# Patient Record
Sex: MALE | Race: WHITE | HISPANIC OR LATINO | URBAN - METROPOLITAN AREA
[De-identification: names, ages, dates, MRNs, and addresses within clinical notes are randomized per-mention and may not be internally consistent; named-entity substitution may affect disease eponyms.]

---

## 2017-04-26 PROBLEM — Z00.00 ENCOUNTER FOR PREVENTIVE HEALTH EXAMINATION: Status: ACTIVE | Noted: 2017-04-26

## 2017-04-27 ENCOUNTER — OUTPATIENT (OUTPATIENT)
Dept: OUTPATIENT SERVICES | Facility: HOSPITAL | Age: 62
LOS: 1 days | End: 2017-04-27
Payer: COMMERCIAL

## 2017-04-27 ENCOUNTER — APPOINTMENT (OUTPATIENT)
Dept: MRI IMAGING | Facility: CLINIC | Age: 62
End: 2017-04-27

## 2017-04-27 DIAGNOSIS — Z00.8 ENCOUNTER FOR OTHER GENERAL EXAMINATION: ICD-10-CM

## 2017-04-27 PROCEDURE — 73718 MRI LOWER EXTREMITY W/O DYE: CPT

## 2017-11-26 ENCOUNTER — TRANSCRIPTION ENCOUNTER (OUTPATIENT)
Age: 62
End: 2017-11-26

## 2021-07-15 ENCOUNTER — APPOINTMENT (OUTPATIENT)
Dept: DERMATOLOGY | Facility: CLINIC | Age: 66
End: 2021-07-15
Payer: MEDICARE

## 2021-07-15 ENCOUNTER — NON-APPOINTMENT (OUTPATIENT)
Age: 66
End: 2021-07-15

## 2021-07-15 DIAGNOSIS — L82.1 OTHER SEBORRHEIC KERATOSIS: ICD-10-CM

## 2021-07-15 DIAGNOSIS — Z12.83 ENCOUNTER FOR SCREENING FOR MALIGNANT NEOPLASM OF SKIN: ICD-10-CM

## 2021-07-15 DIAGNOSIS — D48.5 NEOPLASM OF UNCERTAIN BEHAVIOR OF SKIN: ICD-10-CM

## 2021-07-15 DIAGNOSIS — D18.01 HEMANGIOMA OF SKIN AND SUBCUTANEOUS TISSUE: ICD-10-CM

## 2021-07-15 PROCEDURE — 11102 TANGNTL BX SKIN SINGLE LES: CPT

## 2021-07-15 PROCEDURE — 99203 OFFICE O/P NEW LOW 30 MIN: CPT | Mod: 25

## 2021-07-23 LAB — CORE LAB BIOPSY: NORMAL

## 2022-06-17 ENCOUNTER — INPATIENT (INPATIENT)
Facility: HOSPITAL | Age: 67
LOS: 6 days | Discharge: HOME | DRG: 896 | End: 2022-06-24
Attending: FAMILY MEDICINE | Admitting: FAMILY MEDICINE
Payer: MEDICARE

## 2022-06-17 VITALS — HEIGHT: 67 IN | WEIGHT: 113.1 LBS

## 2022-06-17 DIAGNOSIS — R29.898 OTHER SYMPTOMS AND SIGNS INVOLVING THE MUSCULOSKELETAL SYSTEM: ICD-10-CM

## 2022-06-17 DIAGNOSIS — Z78.9 OTHER SPECIFIED HEALTH STATUS: Chronic | ICD-10-CM

## 2022-06-17 LAB
ADD ON TEST-SPECIMEN IN LAB: SIGNIFICANT CHANGE UP
ADD ON TEST-SPECIMEN IN LAB: SIGNIFICANT CHANGE UP
ALBUMIN SERPL ELPH-MCNC: 2.7 G/DL — LOW (ref 3.3–5)
ALP SERPL-CCNC: 72 U/L — SIGNIFICANT CHANGE UP (ref 40–120)
ALT FLD-CCNC: 27 U/L — SIGNIFICANT CHANGE UP (ref 12–78)
ANION GAP SERPL CALC-SCNC: 10 MMOL/L — SIGNIFICANT CHANGE UP (ref 5–17)
APPEARANCE UR: CLEAR — SIGNIFICANT CHANGE UP
APTT BLD: 27.7 SEC — SIGNIFICANT CHANGE UP (ref 27.5–35.5)
AST SERPL-CCNC: 31 U/L — SIGNIFICANT CHANGE UP (ref 15–37)
BASOPHILS # BLD AUTO: 0.04 K/UL — SIGNIFICANT CHANGE UP (ref 0–0.2)
BASOPHILS NFR BLD AUTO: 0.5 % — SIGNIFICANT CHANGE UP (ref 0–2)
BILIRUB SERPL-MCNC: 0.7 MG/DL — SIGNIFICANT CHANGE UP (ref 0.2–1.2)
BILIRUB UR-MCNC: NEGATIVE — SIGNIFICANT CHANGE UP
BUN SERPL-MCNC: 8 MG/DL — SIGNIFICANT CHANGE UP (ref 7–23)
CALCIUM SERPL-MCNC: 9 MG/DL — SIGNIFICANT CHANGE UP (ref 8.5–10.1)
CHLORIDE SERPL-SCNC: 98 MMOL/L — SIGNIFICANT CHANGE UP (ref 96–108)
CO2 SERPL-SCNC: 23 MMOL/L — SIGNIFICANT CHANGE UP (ref 22–31)
COLOR SPEC: YELLOW — SIGNIFICANT CHANGE UP
CREAT SERPL-MCNC: 1.12 MG/DL — SIGNIFICANT CHANGE UP (ref 0.5–1.3)
DIFF PNL FLD: ABNORMAL
EGFR: 72 ML/MIN/1.73M2 — SIGNIFICANT CHANGE UP
EOSINOPHIL # BLD AUTO: 0.03 K/UL — SIGNIFICANT CHANGE UP (ref 0–0.5)
EOSINOPHIL NFR BLD AUTO: 0.4 % — SIGNIFICANT CHANGE UP (ref 0–6)
GLUCOSE SERPL-MCNC: 129 MG/DL — HIGH (ref 70–99)
GLUCOSE UR QL: NEGATIVE — SIGNIFICANT CHANGE UP
HCT VFR BLD CALC: 46.8 % — SIGNIFICANT CHANGE UP (ref 39–50)
HGB BLD-MCNC: 15.7 G/DL — SIGNIFICANT CHANGE UP (ref 13–17)
IMM GRANULOCYTES NFR BLD AUTO: 0.6 % — SIGNIFICANT CHANGE UP (ref 0–1.5)
INR BLD: 0.95 RATIO — SIGNIFICANT CHANGE UP (ref 0.88–1.16)
KETONES UR-MCNC: NEGATIVE — SIGNIFICANT CHANGE UP
LACTATE SERPL-SCNC: 1.5 MMOL/L — SIGNIFICANT CHANGE UP (ref 0.7–2)
LEUKOCYTE ESTERASE UR-ACNC: ABNORMAL
LYMPHOCYTES # BLD AUTO: 0.99 K/UL — LOW (ref 1–3.3)
LYMPHOCYTES # BLD AUTO: 11.7 % — LOW (ref 13–44)
MAGNESIUM SERPL-MCNC: 2.4 MG/DL — SIGNIFICANT CHANGE UP (ref 1.6–2.6)
MCHC RBC-ENTMCNC: 33 PG — SIGNIFICANT CHANGE UP (ref 27–34)
MCHC RBC-ENTMCNC: 33.5 GM/DL — SIGNIFICANT CHANGE UP (ref 32–36)
MCV RBC AUTO: 98.3 FL — SIGNIFICANT CHANGE UP (ref 80–100)
MONOCYTES # BLD AUTO: 0.8 K/UL — SIGNIFICANT CHANGE UP (ref 0–0.9)
MONOCYTES NFR BLD AUTO: 9.4 % — SIGNIFICANT CHANGE UP (ref 2–14)
NEUTROPHILS # BLD AUTO: 6.57 K/UL — SIGNIFICANT CHANGE UP (ref 1.8–7.4)
NEUTROPHILS NFR BLD AUTO: 77.4 % — HIGH (ref 43–77)
NITRITE UR-MCNC: NEGATIVE — SIGNIFICANT CHANGE UP
PH UR: 6 — SIGNIFICANT CHANGE UP (ref 5–8)
PHOSPHATE SERPL-MCNC: 2 MG/DL — LOW (ref 2.5–4.5)
PLATELET # BLD AUTO: 157 K/UL — SIGNIFICANT CHANGE UP (ref 150–400)
POTASSIUM SERPL-MCNC: 4 MMOL/L — SIGNIFICANT CHANGE UP (ref 3.5–5.3)
POTASSIUM SERPL-SCNC: 4 MMOL/L — SIGNIFICANT CHANGE UP (ref 3.5–5.3)
PROT SERPL-MCNC: 7.3 GM/DL — SIGNIFICANT CHANGE UP (ref 6–8.3)
PROT UR-MCNC: 100
PROTHROM AB SERPL-ACNC: 11 SEC — SIGNIFICANT CHANGE UP (ref 10.5–13.4)
RAPID RVP RESULT: SIGNIFICANT CHANGE UP
RBC # BLD: 4.76 M/UL — SIGNIFICANT CHANGE UP (ref 4.2–5.8)
RBC # FLD: 14.2 % — SIGNIFICANT CHANGE UP (ref 10.3–14.5)
SARS-COV-2 RNA SPEC QL NAA+PROBE: SIGNIFICANT CHANGE UP
SODIUM SERPL-SCNC: 131 MMOL/L — LOW (ref 135–145)
SP GR SPEC: 1.01 — SIGNIFICANT CHANGE UP (ref 1.01–1.02)
TROPONIN I, HIGH SENSITIVITY RESULT: 9.18 NG/L — SIGNIFICANT CHANGE UP
UROBILINOGEN FLD QL: 1
WBC # BLD: 8.48 K/UL — SIGNIFICANT CHANGE UP (ref 3.8–10.5)
WBC # FLD AUTO: 8.48 K/UL — SIGNIFICANT CHANGE UP (ref 3.8–10.5)

## 2022-06-17 PROCEDURE — 71045 X-RAY EXAM CHEST 1 VIEW: CPT | Mod: 26

## 2022-06-17 PROCEDURE — 83690 ASSAY OF LIPASE: CPT

## 2022-06-17 PROCEDURE — 84100 ASSAY OF PHOSPHORUS: CPT

## 2022-06-17 PROCEDURE — 70553 MRI BRAIN STEM W/O & W/DYE: CPT | Mod: MA

## 2022-06-17 PROCEDURE — 36415 COLL VENOUS BLD VENIPUNCTURE: CPT

## 2022-06-17 PROCEDURE — 90662 IIV NO PRSV INCREASED AG IM: CPT

## 2022-06-17 PROCEDURE — 80048 BASIC METABOLIC PNL TOTAL CA: CPT

## 2022-06-17 PROCEDURE — 72148 MRI LUMBAR SPINE W/O DYE: CPT | Mod: MA

## 2022-06-17 PROCEDURE — 85025 COMPLETE CBC W/AUTO DIFF WBC: CPT

## 2022-06-17 PROCEDURE — 93005 ELECTROCARDIOGRAM TRACING: CPT

## 2022-06-17 PROCEDURE — 85027 COMPLETE CBC AUTOMATED: CPT

## 2022-06-17 PROCEDURE — 71275 CT ANGIOGRAPHY CHEST: CPT

## 2022-06-17 PROCEDURE — 83735 ASSAY OF MAGNESIUM: CPT

## 2022-06-17 PROCEDURE — A9579: CPT

## 2022-06-17 PROCEDURE — 97116 GAIT TRAINING THERAPY: CPT | Mod: GP

## 2022-06-17 PROCEDURE — 87040 BLOOD CULTURE FOR BACTERIA: CPT

## 2022-06-17 PROCEDURE — 80053 COMPREHEN METABOLIC PANEL: CPT

## 2022-06-17 PROCEDURE — 99222 1ST HOSP IP/OBS MODERATE 55: CPT | Mod: GC

## 2022-06-17 PROCEDURE — 82248 BILIRUBIN DIRECT: CPT

## 2022-06-17 PROCEDURE — 71045 X-RAY EXAM CHEST 1 VIEW: CPT

## 2022-06-17 PROCEDURE — 74177 CT ABD & PELVIS W/CONTRAST: CPT

## 2022-06-17 PROCEDURE — 76700 US EXAM ABDOM COMPLETE: CPT

## 2022-06-17 PROCEDURE — 97162 PT EVAL MOD COMPLEX 30 MIN: CPT | Mod: GP

## 2022-06-17 PROCEDURE — 99285 EMERGENCY DEPT VISIT HI MDM: CPT | Mod: FS

## 2022-06-17 PROCEDURE — 93010 ELECTROCARDIOGRAM REPORT: CPT

## 2022-06-17 PROCEDURE — 86803 HEPATITIS C AB TEST: CPT

## 2022-06-17 PROCEDURE — 97530 THERAPEUTIC ACTIVITIES: CPT | Mod: GP

## 2022-06-17 PROCEDURE — 70553 MRI BRAIN STEM W/O & W/DYE: CPT | Mod: 26

## 2022-06-17 PROCEDURE — 70450 CT HEAD/BRAIN W/O DYE: CPT | Mod: 26,MA

## 2022-06-17 PROCEDURE — 72148 MRI LUMBAR SPINE W/O DYE: CPT | Mod: 26

## 2022-06-17 PROCEDURE — 99285 EMERGENCY DEPT VISIT HI MDM: CPT

## 2022-06-17 RX ORDER — ENOXAPARIN SODIUM 100 MG/ML
40 INJECTION SUBCUTANEOUS EVERY 24 HOURS
Refills: 0 | Status: DISCONTINUED | OUTPATIENT
Start: 2022-06-17 | End: 2022-06-24

## 2022-06-17 RX ORDER — PANTOPRAZOLE SODIUM 20 MG/1
40 TABLET, DELAYED RELEASE ORAL
Refills: 0 | Status: DISCONTINUED | OUTPATIENT
Start: 2022-06-17 | End: 2022-06-24

## 2022-06-17 RX ORDER — SODIUM CHLORIDE 9 MG/ML
2000 INJECTION INTRAMUSCULAR; INTRAVENOUS; SUBCUTANEOUS ONCE
Refills: 0 | Status: COMPLETED | OUTPATIENT
Start: 2022-06-17 | End: 2022-06-17

## 2022-06-17 RX ORDER — ACETAMINOPHEN 500 MG
1000 TABLET ORAL ONCE
Refills: 0 | Status: COMPLETED | OUTPATIENT
Start: 2022-06-17 | End: 2022-06-17

## 2022-06-17 RX ORDER — PIPERACILLIN AND TAZOBACTAM 4; .5 G/20ML; G/20ML
3.38 INJECTION, POWDER, LYOPHILIZED, FOR SOLUTION INTRAVENOUS ONCE
Refills: 0 | Status: COMPLETED | OUTPATIENT
Start: 2022-06-17 | End: 2022-06-17

## 2022-06-17 RX ORDER — FOLIC ACID 0.8 MG
1 TABLET ORAL DAILY
Refills: 0 | Status: DISCONTINUED | OUTPATIENT
Start: 2022-06-17 | End: 2022-06-24

## 2022-06-17 RX ORDER — CEFTRIAXONE 500 MG/1
1000 INJECTION, POWDER, FOR SOLUTION INTRAMUSCULAR; INTRAVENOUS ONCE
Refills: 0 | Status: COMPLETED | OUTPATIENT
Start: 2022-06-17 | End: 2022-06-17

## 2022-06-17 RX ORDER — ONDANSETRON 8 MG/1
4 TABLET, FILM COATED ORAL EVERY 4 HOURS
Refills: 0 | Status: DISCONTINUED | OUTPATIENT
Start: 2022-06-17 | End: 2022-06-24

## 2022-06-17 RX ORDER — SODIUM,POTASSIUM PHOSPHATES 278-250MG
1 POWDER IN PACKET (EA) ORAL
Refills: 0 | Status: COMPLETED | OUTPATIENT
Start: 2022-06-17 | End: 2022-06-18

## 2022-06-17 RX ORDER — THIAMINE MONONITRATE (VIT B1) 100 MG
500 TABLET ORAL EVERY 8 HOURS
Refills: 0 | Status: DISCONTINUED | OUTPATIENT
Start: 2022-06-17 | End: 2022-06-20

## 2022-06-17 RX ORDER — OMEGA-3 ACID ETHYL ESTERS 1 G
1 CAPSULE ORAL
Qty: 0 | Refills: 0 | DISCHARGE

## 2022-06-17 RX ORDER — PIPERACILLIN AND TAZOBACTAM 4; .5 G/20ML; G/20ML
3.38 INJECTION, POWDER, LYOPHILIZED, FOR SOLUTION INTRAVENOUS EVERY 8 HOURS
Refills: 0 | Status: DISCONTINUED | OUTPATIENT
Start: 2022-06-17 | End: 2022-06-18

## 2022-06-17 RX ADMIN — Medication 1000 MILLIGRAM(S): at 15:50

## 2022-06-17 RX ADMIN — CEFTRIAXONE 100 MILLIGRAM(S): 500 INJECTION, POWDER, FOR SOLUTION INTRAMUSCULAR; INTRAVENOUS at 15:50

## 2022-06-17 RX ADMIN — SODIUM CHLORIDE 2000 MILLILITER(S): 9 INJECTION INTRAMUSCULAR; INTRAVENOUS; SUBCUTANEOUS at 15:49

## 2022-06-17 NOTE — ED PROVIDER NOTE - OBJECTIVE STATEMENT
65 yo male current day smoker (5 cigarettes daily) and ETOh use (2 shots of vodka daily)with a PMH of GERD (stopped taking his meds) presents with dysuria, frequency, urge incontinence x 1 week. Pt went to see Dr. Das who told him to come to the ER because he noticed that he has been having weakness to his lower extremities (having trouble standing up and walking, with shuffling gait) and also with wife noticed that he is incoherent. +nausea/vomiting.   Denies sob, cp.

## 2022-06-17 NOTE — ED PROVIDER NOTE - PROGRESS NOTE DETAILS
pt seen and examined.  pw weakness and confusion/AMS.  on exam NAD.  no focal deficits on neuro exam.  will admit to medicine for further care and evaluation. MD Marsha

## 2022-06-17 NOTE — H&P ADULT - NSHPREVIEWOFSYSTEMS_GEN_ALL_CORE
REVIEW OF SYSTEMS:  CONSTITUTIONAL: No weakness, fevers or chills  EYES/ENT: No visual changes;  No vertigo or throat pain   NECK: No pain or stiffness  RESPIRATORY: No cough, wheezing, hemoptysis; No shortness of breath  CARDIOVASCULAR: No chest pain or palpitations  GASTROINTESTINAL: No abdominal or epigastric pain. No nausea, vomiting, or hematemesis; No diarrhea or constipation. No melena or hematochezia.  GENITOURINARY: No dysuria, frequency or hematuria  NEUROLOGICAL: No numbness or weakness  SKIN: No itching, rashes REVIEW OF SYSTEMS:  CONSTITUTIONAL: + weakness, No fevers or chills  EYES/ENT: No visual changes;  No vertigo or throat pain   NECK: No pain or stiffness  RESPIRATORY: No cough, wheezing, hemoptysis; No shortness of breath  CARDIOVASCULAR: No chest pain or palpitations  GASTROINTESTINAL: +ve epigastric pain. + nausea, + vomiting. No hematemesis; No diarrhea or constipation. No melena or hematochezia.  GENITOURINARY: No dysuria. + frequency. no hematuria  NEUROLOGICAL: No numbness. Lower extremity weakness  SKIN: No itching, rashes REVIEW OF SYSTEMS:  CONSTITUTIONAL: + weakness, No fevers. +ve chills  EYES/ENT: No visual changes;  No vertigo or throat pain   NECK: No pain or stiffness  RESPIRATORY: No cough, wheezing, hemoptysis; No shortness of breath  CARDIOVASCULAR: No chest pain or palpitations  GASTROINTESTINAL: +ve epigastric pain. + nausea, + vomiting. No hematemesis; No diarrhea or constipation. No melena or hematochezia.  GENITOURINARY: No dysuria. + frequency. no hematuria  NEUROLOGICAL: No numbness. Lower extremity weakness  SKIN: No itching, rashes

## 2022-06-17 NOTE — ED PROVIDER NOTE - CLINICAL SUMMARY MEDICAL DECISION MAKING FREE TEXT BOX
67 yo male presents with urinary complaints, weakness, incoherent. WIll check labs, UA, CT head, cxr, meds for possible UTI. Reeval. -Nicholas Barron PA-C 67 yo male presents with urinary complaints, weakness, incoherent. Will check labs, UA, CT head, cxr, meds for possible UTI. Reeval. -Nicholas Barron PA-C

## 2022-06-17 NOTE — PHARMACOTHERAPY INTERVENTION NOTE - COMMENTS
Medication reconciliation completed.  Reviewed Medication list and confirmed med allergies with patient; confirmed with Dr. Menendez [facility].  pt confirms that he does not take any prescription medication daily.

## 2022-06-17 NOTE — CONSULT NOTE ADULT - SUBJECTIVE AND OBJECTIVE BOX
Patient is a 66y old  Male who presents with a chief complaint of lower extremity weakness, difficulty walking, urinary frequency and memory problems.    HPI: Mr. Valladares is a 66 year old man who reports that for approximately two weeks he has had weakness in his lower extremities.  He describes a loss of mobility and states that his wife reports that he is shuffling when walking.  His wife has also reported problems with memory. He admits to word finding difficulty.  He also reports increased urinary frequency and urgency.    He has recently been cutting back on alcohol intake. He used to have ~ 2 shots every day and is now having 2 shots on some nights.    He had a temperature of 100.4 in the ED.      PAST MEDICAL & SURGICAL HISTORY:  GERD    FAMILY HISTORY:  Heart disease  cancer      Social Hx:  + ETOH and tobacco use    MEDICATIONS  (STANDING):       Allergies    No Known Allergies    Intolerances        ROS: Pertinent positives in HPI, all other ROS were reviewed and are negative.      Vital Signs Last 24 Hrs  T(C): 36.8 (17 Jun 2022 16:45), Max: 38 (17 Jun 2022 14:14)  T(F): 98.3 (17 Jun 2022 16:45), Max: 100.4 (17 Jun 2022 14:14)  HR: 80 (17 Jun 2022 16:45) (80 - 89)  BP: 112/67 (17 Jun 2022 16:45) (111/68 - 129/71)  BP(mean): 88 (17 Jun 2022 14:14) (88 - 88)  RR: 16 (17 Jun 2022 16:45) (15 - 18)  SpO2: 95% (17 Jun 2022 16:45) (95% - 97%)        Constitutional: awake and alert.  HEENT: PERRLA, EOMI,   Neck: Supple.  Respiratory: Breath sounds are clear bilaterally  Cardiovascular: S1 and S2, regular / irregular rhythm  Gastrointestinal: soft, nontender  Extremities:  no edema  Musculoskeletal: no joint swelling/tenderness, no abnormal movements  Skin: No rashes    Neurological exam:  HF: Awake and alert. Oriented to person, place, time.  Appropriately interactive, normal affect. Speech fluent, No Aphasia or paraphasic errors.  CN: CHRIS, EOMI, VFF, facial sensation normal, no NLFD, tongue midline, Palate moves equally, SCM equal bilaterally  Motor: No pronator drift, Strength 5/5 in all 4 ext, normal bulk and tone, no tremor, rigidity or bradykinesia.    Sens: Intact to light touch and joint position sense  Reflexes: Symmetric and normal . BJ 1+, BR 1+, KJ 2+, AJ 1+, downgoing toes b/l  Coord:  No FNFA, dysmetria, HONEY intact   Gait/Balance: steady but short steps            Labs:   06-17    131<L>  |  98  |  8   ----------------------------<  129<H>  4.0   |  23  |  1.12    Ca    9.0      17 Jun 2022 15:07    TPro  7.3  /  Alb  2.7<L>  /  TBili  0.7  /  DBili  x   /  AST  31  /  ALT  27  /  AlkPhos  72  06-17                              15.7   8.48  )-----------( 157      ( 17 Jun 2022 15:07 )             46.8       Radiology:  CT head 6/17/22:  Minimal chronic microvascular changes without evidence ofan   acute transcortical infarction or hemorrhage.

## 2022-06-17 NOTE — H&P ADULT - NSHPSOURCEINFORD_GEN_ALL_CORE
Patient Patient/Spouse/Significant Other Chart(s)/Patient/Spouse/Significant Other/Physician/Provider

## 2022-06-17 NOTE — H&P ADULT - NSHPPHYSICALEXAM_GEN_ALL_CORE
Vital Signs Last 24 Hrs  T(C): 36.8 (17 Jun 2022 16:45), Max: 38 (17 Jun 2022 14:14)  T(F): 98.3 (17 Jun 2022 16:45), Max: 100.4 (17 Jun 2022 14:14)  HR: 80 (17 Jun 2022 19:45) (80 - 89)  BP: 135/71 (17 Jun 2022 19:45) (111/68 - 135/71)  BP(mean): 83 (17 Jun 2022 19:45) (83 - 88)  RR: 18 (17 Jun 2022 19:45) (15 - 18)  SpO2: 98% (17 Jun 2022 19:45) (95% - 98%) Vital Signs Last 24 Hrs  T(C): 36.8 (17 Jun 2022 16:45), Max: 38 (17 Jun 2022 14:14)  T(F): 98.3 (17 Jun 2022 16:45), Max: 100.4 (17 Jun 2022 14:14)  HR: 80 (17 Jun 2022 19:45) (80 - 89)  BP: 135/71 (17 Jun 2022 19:45) (111/68 - 135/71)  BP(mean): 83 (17 Jun 2022 19:45) (83 - 88)  RR: 18 (17 Jun 2022 19:45) (15 - 18)  SpO2: 98% (17 Jun 2022 19:45) (95% - 98%)    PHYSICAL EXAM:  GENERAL: NAD, lying in bed comfortably  HEAD:  Atraumatic, Normocephalic  EYES: EOMI, PERRLA, conjunctiva and sclera clear  ENT: Moist mucous membranes  NECK: Supple, No JVD  Pulm: Clear to auscultation bilaterally; No rales, rhonchi, wheezing, or rubs. Unlabored respirations  Cardio: Regular rate and rhythm; No murmurs, rubs, or gallops  Gastro: Bowel sounds present; Soft, Nontender, Nondistended. No hepatomegally  EXTREMITIES:  2+ Peripheral Pulses, brisk capillary refill. No clubbing, cyanosis, or edema  NERVOUS SYSTEM:  Alert & Oriented speech clear. No deficits   MSK: FROM all 4 extremities,  SKIN: No rashes or lesions Vital Signs Last 24 Hrs  T(C): 36.8 (17 Jun 2022 16:45), Max: 38 (17 Jun 2022 14:14)  T(F): 98.3 (17 Jun 2022 16:45), Max: 100.4 (17 Jun 2022 14:14)  HR: 80 (17 Jun 2022 19:45) (80 - 89)  BP: 135/71 (17 Jun 2022 19:45) (111/68 - 135/71)  BP(mean): 83 (17 Jun 2022 19:45) (83 - 88)  RR: 18 (17 Jun 2022 19:45) (15 - 18)  SpO2: 98% (17 Jun 2022 19:45) (95% - 98%)    PHYSICAL EXAM:  GENERAL: NAD, lying in bed with tremors  HEAD:  Atraumatic, Normocephalic  EYES: EOMI, PERRLA, conjunctiva and sclera clear  ENT: Moist mucous membranes  NECK: Supple, No JVD  Pulm: Clear to auscultation bilaterally; No rales, rhonchi, wheezing, or rubs. Unlabored respirations  Cardio: Regular rate and rhythm; No murmurs, rubs, or gallops  Gastro: Bowel sounds present; Soft, Nontender, Nondistended. No hepatomegally  EXTREMITIES:  2+ Peripheral Pulses, brisk capillary refill. No clubbing, cyanosis, or edema  Genit/uro: MAN Boggy but nonenlarged/nontender prostate  NERVOUS SYSTEM:  Alert & Orientedx3 speech clear. No deficits   MSK: FROM all 4 extremities, 4/5 strength. Unstable on feet  SKIN: No rashes or lesions

## 2022-06-17 NOTE — ED PROVIDER NOTE - ATTENDING APP SHARED VISIT CONTRIBUTION OF CARE
I, Desirae Stovall MD,  performed the initial face to face bedside interview with this patient regarding history of present illness, review of symptoms and relevant past medical, social and family history.  I completed an independent physical examination.  I was the initial provider who evaluated this patient.   I personally saw the patient and performed a substantive portion of the visit including all aspects of the medical decision making.  I have signed out the follow up of any pending tests (i.e. labs, radiological studies) to the [facility].  I have communicated the patient’s plan of care and disposition with the [facility].

## 2022-06-17 NOTE — H&P ADULT - HISTORY OF PRESENT ILLNESS
Pt is a 65 yo male current day smoker (5 cigarettes daily) and ETOh use (2 shots of vodka daily) with a PMH of GERD who presents with dysuria, frequency, urge incontinence x 1 week. Pt went to see Dr. Das who told him to come to the ER because he noticed that he has been having weakness to his lower extremities (having trouble standing up and walking, with shuffling gait) and also with wife noticed that he is incoherent. +nausea/vomiting.       Patient is a 65 yo male current day smoker (5 cigarettes daily) and Chronic ETOH missuse (10+ years) with a PMH of GERD who presents with weakness, frequency, urge incontinence x 1 week. Patient reports he went to see Dr. Das(PCP) today for this problems and was told to come to the ER. Pt reports he noticed associated weakness to his lower extremities (having trouble standing up and walking, with shuffling gait) and also with wife noticed that he is incoherent. +nausea/vomiting.        Patient is a 65 yo male current day smoker (5 cigarettes daily) and Chronic ETOH missuse (10+ years) with a PMH of GERD who presents with weakness, frequency, urge incontinence x 1 week. Patient reports he went to see Dr. Das(PCP) today for this problems and was told to come to the ER. Pt reports he noticed increased weakness to his lower extremities (having trouble standing up and walking, with shuffling gait) for the past 2 days. Patient has a chronic hx of nocturia (Voids 3-4x) a night for past 5 years and has not been evaluated for this medical issue. Spouse notes patient with increased confusion and short term memory loss in the past week. Denies hx of UTIs or ETOH withdrawals. Denies fevers, chills, HA, recent falls. Last drink was yesterday night.     In the ED patient VS were temp 100.4, /71, RR 18, Pulse ox 97. CT head was unremarkable for intracranial bleeding, POCT UA was positive for blood and bacteria. Pt was given 2L of fluids and ceftriaxone.                 Patient is a 65 yo male current day smoker (5 cigarettes daily) and Chronic ETOH missuse (10+ years) with a PMH of GERD who presents with weakness, frequency, urge incontinence x 2 weeks. Patient reports he went to see Dr. Das(PCP) today for this problems and was told to come to the ER. Pt reports he noticed increased weakness to his lower extremities (having trouble standing up and walking, with shuffling gait) for the past 2 days. Patient has a chronic hx of nocturia (Voids 3-4x) a night for past 5 years and has not been evaluated for this medical issue. Spouse notes patient with increased confusion and short term memory loss in the past week. Denies hx of UTIs or ETOH withdrawals. Denies fevers, chills, HA, recent falls, urinary hesitancy. Last etoh drink was yesterday night.     In the ED patient VS were temp 100.4, /71, RR 18, Pulse ox 97. CT head was unremarkable for intracranial bleeding, POCT UA was positive for blood and bacteria. Pt was given 2L of fluids and ceftriaxone.

## 2022-06-17 NOTE — ED ADULT NURSE REASSESSMENT NOTE - NS ED NURSE REASSESS COMMENT FT1
Assumed care of Pt from DERRICK Presley. Pt received resting comfortably in stretcher. Pt's wife at bedside. Pt's fluids running through IV in left AC. IV is positional. Vital signs stable. No additional requests or complaints. Patient safety maintained. Call bell within reach. Will continue to monitor.

## 2022-06-17 NOTE — ED STATDOCS - PROGRESS NOTE DETAILS
Jimi Emerson for attending Dr. Hull   67 yo male w/PMHx of GERD presents to the ED c/o SOB, frequent urination, fever. Pt denies CP. +dysuria. Pt has nausea and vomiting. No blood in emesis. NKDA. Denies sick contacts. Pt went to PMD today and was told to come to the ED for evaluation. According to the wife, pt has cognitive impairment, not walking well, trouble remembering, shuffling his feet. +diaphoretic today. No other complaints at this time. Did not take any medication today. Will send pt to main ED for further evaluation.

## 2022-06-17 NOTE — H&P ADULT - ASSESSMENT
#Generalized weakness in the setting of Hyponatremia  -R/O infectious (UTI vs Prostatis) vs metabolic vs neurological etiology vs ETOH withdrawal  -Admit to 2S  -HD Stable  -CT Head positive for microvascular changes  -POCT UA- +ve for Protein, +ve bacteria, +ve blood  - on addmission- Repeat   -Neurology on Board- Less likely NPH. F/U MRI   -Ordered ID consult    #Hx of ETOH missuse  -ETOH level less than 10  -Symptom triggered CIWA protocol      #Code status      #DVT proph                 #Metabolic encephalopathy in the setting of Hyponatremia  -R/O infectious (UTI vs Prostatis) vs metabolic etiology vs neurological etiology vs ETOH withdrawal  -Admit to 2S  -HD Stable  -CT Head positive for microvascular changes  -SIRS 1/4 temp 100.4 F Mod QSofa 1/3 AMS  -POCT UA- +ve for Protein, +ve bacteria, +ve blood  -S/P Ceftriaxone and 2L bolus in Ed  -FU urine and blood cultures  - on admission Repeat   -Neurology on Board- Less likely NPH. F/U MRI   -Ordered ID consult  -Ordered PT Consult     #Hx of ETOH Abuse  -2L of Scotch every 3 days.  Last drink yesterday evening   -ETOH level less than 10 on admission  -Standing Ativan taper CIWA protocol      #Hyponatremia  -F/U BMP  -s/p 2L NS in Ed    #LUTS in setting of BPH   -R/O prostatitis vs UTI  -Ordered routine bladder scans      #Code status  -Full Code    #DVT proph  -Lovenox 40U Daily                #Metabolic encephalopathy in the setting of Hyponatremia  -R/O infectious (UTI vs Prostatitis vs metabolic etiology vs neurological etiology vs ETOH withdrawal  -Admit to 2S  -HD Stable  -CT Head positive for microvascular changes  -SIRS 1/4 temp 100.4 F Mod QSofa 1/3 AMS  -POCT UA- +ve for Protein, +ve bacteria, +ve blood  -S/P Ceftriaxone and 2L bolus in Ed  -FU urine and blood cultures  - on admission Repeat   -Neurology on Board- Less likely NPH. F/U MRI   -Ordered ID consult  -Ordered PT Consult     #Hx of ETOH Abuse  -2L of Scotch every 3 days.  Last drink yesterday evening   -ETOH level less than 10 on admission  -Standing Ativan taper CIWA protocol      #Hyponatremia  -F/U BMP  -s/p 2L NS in Ed    #LUTS in setting of BPH   -R/O prostatitis vs UTI  -Ordered routine bladder scans    #GERD  -Ordered Omeprazole PO daily     #Code status  -Full Code    #DVT proph  -Lovenox 40U Daily                #Generalized weakness in the setting of multifactorial causes(Chronic ETOH ABuse vs UTI)  -R/O infectious (UTI vs Prostatitis) vs etoh abuse vs metabolic etiology vs neurological etiology   -Admit to 2S  -HD Stable  -CT Head positive for microvascular changes  -SIRS 1/4 temp 100.4 F Mod QSofa 1/3 AMS  -POCT UA- +ve for Protein, +ve bacteria, +ve blood  -S/P Ceftriaxone and 2L bolus in Ed  -Start Zosyn IV   -FU urine and blood cultures  - on admission Repeat 134  -Neurology on Board- Less likely NPH. F/U MRI head  -Ordered ID consult  -Ordered PT Consult   -Low threshold to order CT abdomen/pelvis if spikes another temp     #ETOH Withdrawal  #Hx of ETOH Abuse  -2L of Scotch every 3 days.  Last drink yesterday evening   -ETOH level less than 10 on admission  -Standing librium/Ativan taper CIWA protocol    #Urge incontinence  -R/O prostatitis vs UTI  -Ordered routine bladder scans  -Ordered Urology referral   -Glucose 130 A1c Ordered     #Hyponatremia  #Hypophosphatemia  -F/U BMP  -s/p 2L NS in Ed  -Replete lytes    #GERD  -Ordered Omeprazole PO daily     #Code status  -Full Code    #DVT proph  -Lovenox 40U Daily                #Generalized weakness in the setting of multifactorial causes(Chronic ETOH ABuse vs UTI)  -R/O infectious (UTI vs Prostatitis) vs etoh abuse vs metabolic etiology vs neurological etiology   -Admit to 2S  -HD Stable  -CT Head positive for microvascular changes  -SIRS 1/4 temp 100.4 F Mod QSofa 1/3 AMS  -POCT UA- +ve for Protein, +ve bacteria, +ve blood  -S/P Ceftriaxone and 2L bolus in Ed  -Start Zosyn IV   -FU urine and blood cultures  - on admission Repeat 134  -Neurology on Board- Less likely NPH. F/U MRI head  -Ordered ID consult  -Ordered PT Consult   -Low threshold to order CT abdomen/pelvis if spikes another temp     #Mild ETOH Withdrawal  #Hx of ETOH Abuse  DDx- Wernieke Encephalopathy   -2L of Scotch every 3 days for past 10 years. Last drink yesterday evening.   -ETOH level less than 10 on admission  -CIWA Score 2 on 2S  -Standing librium with Ativan symptom triggered CIWA protocol. High risk for ETOH Withdrawal. Switch to standing ativan if symptoms worsen in next 24-48 hours.  -Start thiamine 500mg IV TID and folate    #Urge incontinence  -R/O prostatitis vs UTI  -Ordered routine bladder scans  -Ordered Urology referral   -Glucose 130 A1c Ordered     #Hyponatremia  #Hypophosphatemia  -F/U BMP  -s/p 2L NS in Ed  -Replete lytes    #GERD  -Ordered Omeprazole PO daily     #Code status  -Full Code    #DVT proph  -Lovenox 40U Daily

## 2022-06-17 NOTE — H&P ADULT - NSHPSOCIALHISTORY_GEN_ALL_CORE
Chronic Smoker 5 ciggarettes daily for 20+ years. Chronic ETOH Use 10 + years drinks Scotch 2 L every 3 days. Lives at home with his wife. Independent of ADLs and IADLS.

## 2022-06-17 NOTE — ED ADULT NURSE REASSESSMENT NOTE - NS ED NURSE REASSESS COMMENT FT1
Report given to Tiana on [address]. Pt to be transported to unit from [facility].

## 2022-06-17 NOTE — PATIENT PROFILE ADULT - FALL HARM RISK - HARM RISK INTERVENTIONS

## 2022-06-17 NOTE — ED ADULT NURSE NOTE - OBJECTIVE STATEMENT
pt presents to the ED c/o dysuria, frequency, urge incontinence x 1 week. pt states his urine is "foul smelling." as per pt wife also noticed patient with difficulty ambulating with lower extremity weakness and "shuffling gait" and unsteady gait x 1 week. pt is awake and following commands appropriately upon assessment. A+Ox4, speech is clear, able to move all extremities. Denies headache, dizziness, visual changes. Denies cp. Safety is maintained with call bell within reach

## 2022-06-17 NOTE — ED ADULT TRIAGE NOTE - CHIEF COMPLAINT QUOTE
pt c/o SOB at rest and urinary incontinence with foul smelling urine x 1 week. denies fevers or dysuria.

## 2022-06-17 NOTE — ED ADULT NURSE NOTE - NS ED NURSE LEVEL OF CONSCIOUSNESS AFFECT
Left message on patient's voicemail to return call to clinic regarding scheduling Diabetes management appointment with Brittni Katz NP for elevated A1c. Waiting to hear back.  
Calm

## 2022-06-17 NOTE — ED PROVIDER NOTE - CARE PLAN
1 Principal Discharge DX:	Lower extremity weakness  Secondary Diagnosis:	Incontinence  Secondary Diagnosis:	Confusion

## 2022-06-18 LAB
ALBUMIN SERPL ELPH-MCNC: 2.1 G/DL — LOW (ref 3.3–5)
ALP SERPL-CCNC: 68 U/L — SIGNIFICANT CHANGE UP (ref 40–120)
ALT FLD-CCNC: 26 U/L — SIGNIFICANT CHANGE UP (ref 12–78)
ANION GAP SERPL CALC-SCNC: 8 MMOL/L — SIGNIFICANT CHANGE UP (ref 5–17)
AST SERPL-CCNC: 26 U/L — SIGNIFICANT CHANGE UP (ref 15–37)
BILIRUB SERPL-MCNC: 0.5 MG/DL — SIGNIFICANT CHANGE UP (ref 0.2–1.2)
BUN SERPL-MCNC: 7 MG/DL — SIGNIFICANT CHANGE UP (ref 7–23)
CALCIUM SERPL-MCNC: 8.7 MG/DL — SIGNIFICANT CHANGE UP (ref 8.5–10.1)
CHLORIDE SERPL-SCNC: 104 MMOL/L — SIGNIFICANT CHANGE UP (ref 96–108)
CO2 SERPL-SCNC: 22 MMOL/L — SIGNIFICANT CHANGE UP (ref 22–31)
CREAT SERPL-MCNC: 0.92 MG/DL — SIGNIFICANT CHANGE UP (ref 0.5–1.3)
CULTURE RESULTS: SIGNIFICANT CHANGE UP
EGFR: 92 ML/MIN/1.73M2 — SIGNIFICANT CHANGE UP
GLUCOSE SERPL-MCNC: 113 MG/DL — HIGH (ref 70–99)
HCV AB S/CO SERPL IA: 0.07 S/CO — SIGNIFICANT CHANGE UP (ref 0–0.99)
HCV AB SERPL-IMP: SIGNIFICANT CHANGE UP
MAGNESIUM SERPL-MCNC: 2.4 MG/DL — SIGNIFICANT CHANGE UP (ref 1.6–2.6)
PHOSPHATE SERPL-MCNC: 2.8 MG/DL — SIGNIFICANT CHANGE UP (ref 2.5–4.5)
POTASSIUM SERPL-MCNC: 3.9 MMOL/L — SIGNIFICANT CHANGE UP (ref 3.5–5.3)
POTASSIUM SERPL-SCNC: 3.9 MMOL/L — SIGNIFICANT CHANGE UP (ref 3.5–5.3)
PROT SERPL-MCNC: 6.3 GM/DL — SIGNIFICANT CHANGE UP (ref 6–8.3)
SODIUM SERPL-SCNC: 134 MMOL/L — LOW (ref 135–145)
SPECIMEN SOURCE: SIGNIFICANT CHANGE UP
VIT B12 SERPL-MCNC: 531 PG/ML — SIGNIFICANT CHANGE UP (ref 232–1245)

## 2022-06-18 PROCEDURE — 99233 SBSQ HOSP IP/OBS HIGH 50: CPT | Mod: GC

## 2022-06-18 RX ORDER — CEFTRIAXONE 500 MG/1
1000 INJECTION, POWDER, FOR SOLUTION INTRAMUSCULAR; INTRAVENOUS EVERY 24 HOURS
Refills: 0 | Status: DISCONTINUED | OUTPATIENT
Start: 2022-06-18 | End: 2022-06-23

## 2022-06-18 RX ORDER — AZITHROMYCIN 500 MG/1
500 TABLET, FILM COATED ORAL DAILY
Refills: 0 | Status: DISCONTINUED | OUTPATIENT
Start: 2022-06-18 | End: 2022-06-22

## 2022-06-18 RX ORDER — INFLUENZA VIRUS VACCINE 15; 15; 15; 15 UG/.5ML; UG/.5ML; UG/.5ML; UG/.5ML
0.7 SUSPENSION INTRAMUSCULAR ONCE
Refills: 0 | Status: COMPLETED | OUTPATIENT
Start: 2022-06-18 | End: 2022-06-24

## 2022-06-18 RX ADMIN — PANTOPRAZOLE SODIUM 40 MILLIGRAM(S): 20 TABLET, DELAYED RELEASE ORAL at 06:14

## 2022-06-18 RX ADMIN — Medication 255 MILLIGRAM(S): at 16:14

## 2022-06-18 RX ADMIN — AZITHROMYCIN 500 MILLIGRAM(S): 500 TABLET, FILM COATED ORAL at 09:54

## 2022-06-18 RX ADMIN — Medication 1 PACKET(S): at 06:20

## 2022-06-18 RX ADMIN — Medication 1 PACKET(S): at 00:30

## 2022-06-18 RX ADMIN — Medication 255 MILLIGRAM(S): at 23:44

## 2022-06-18 RX ADMIN — Medication 255 MILLIGRAM(S): at 01:12

## 2022-06-18 RX ADMIN — CEFTRIAXONE 100 MILLIGRAM(S): 500 INJECTION, POWDER, FOR SOLUTION INTRAMUSCULAR; INTRAVENOUS at 09:54

## 2022-06-18 RX ADMIN — Medication 1 MILLIGRAM(S): at 09:53

## 2022-06-18 RX ADMIN — Medication 2 MILLIGRAM(S): at 22:22

## 2022-06-18 RX ADMIN — Medication 10 MILLIGRAM(S): at 00:29

## 2022-06-18 RX ADMIN — ENOXAPARIN SODIUM 40 MILLIGRAM(S): 100 INJECTION SUBCUTANEOUS at 00:30

## 2022-06-18 RX ADMIN — ENOXAPARIN SODIUM 40 MILLIGRAM(S): 100 INJECTION SUBCUTANEOUS at 23:43

## 2022-06-18 RX ADMIN — Medication 255 MILLIGRAM(S): at 10:43

## 2022-06-18 RX ADMIN — PIPERACILLIN AND TAZOBACTAM 200 GRAM(S): 4; .5 INJECTION, POWDER, LYOPHILIZED, FOR SOLUTION INTRAVENOUS at 00:30

## 2022-06-18 NOTE — PROGRESS NOTE ADULT - ASSESSMENT
#Generalized weakness in the setting of multifactorial causes(Chronic ETOH ABuse vs UTI)  -R/O infectious (UTI vs Prostatitis) vs etoh abuse vs metabolic etiology vs neurological etiology     Likely Pneumonia, UTI  -Admit to 2S  -HD Stable  -CT Head positive for microvascular changes  -SIRS 1/4 temp 100.4 F Mod QSofa 1/3 AMS  -POCT UA- +ve for Protein, +ve bacteria, +ve blood  -S/P Ceftriaxone and 2L bolus in Ed  -S/p Zosyn IV; ID: ceftriaxone + azithromycin  -FU urine and blood cultures  - on admission Repeat 134  -Neurology on Board- Less likely NPH. F/U MRI head  -Ordered ID consult  -Ordered PT Consult   -Low threshold to order CT abdomen/pelvis if spikes another temp     #Mild ETOH Withdrawal  #Hx of ETOH Abuse  DDx- Wernieke Encephalopathy   -2L of Scotch every 3 days for past 10 years. Last drink yesterday evening.   -ETOH level less than 10 on admission  -CIWA Score 2 on 2S  -Standing librium with Ativan symptom triggered CIWA protocol. High risk for ETOH Withdrawal. Switch to standing ativan if symptoms worsen in next 24-48 hours.  -Start thiamine 500mg IV TID and folate    #Urge incontinence  -R/O prostatitis vs UTI  -Ordered routine bladder scans  -Ordered Urology referral   -Glucose 130 A1c Ordered     #Hyponatremia  #Hypophosphatemia  -F/U BMP  -s/p 2L NS in Ed  -Replete lytes    #GERD  -Ordered Omeprazole PO daily     #Code status  -Full Code    #DVT proph  -Lovenox 40U Daily      Patient is a 67 yo male current day smoker (5 cigarettes daily) and Chronic ETOH missuse (10+ years) with a PMH of GERD who presents with weakness, frequency, urge incontinence x 2 weeks.     #Weakness 2/2 Likely Acute UTI, possible Subacute Pneumonia  #Urge incontinence  -Admit to 2S  -HD Stable  -CT Head positive for microvascular changes  -SIRS 1/4 temp 100.4 F Mod QSofa 1/3 AMS  -POCT UA- +ve for Protein, +ve bacteria, +ve blood  -S/P Ceftriaxone and 2L bolus in Ed  -S/p Zosyn IV; ID recs: ceftriaxone + azithromycin (day 1)  -FU urine and blood cultures  - on admission Repeat 134  -Neurology on Board- Less likely NPH. F/U MRI head  -Ordered ID consult  -Ordered PT Consult   -Ordered routine bladder scans  -Ordered Urology referral   -Low threshold to order CT abdomen/pelvis if spikes another temp     #Confusion 2/2 Wernicke's  #Hx of ETOH Abuse  - Pt w/long hx of ETOH abuse, now confused,  ETOH level less than 10 on admission, CIWAs 0-2  - S/p librium dose, C/w Ativan symptom triggered CIWA protocol for now d/t high risk for ETOH Withdrawal.   - C/w thiamine 500mg IV TID and folate  - Neurology consult     #GERD  -C/w Omeprazole PO daily     #Code status  -Full Code    #DVT pppx  -Lovenox 40U Daily     Case discussed with Dr. Cui

## 2022-06-18 NOTE — DIETITIAN INITIAL EVALUATION ADULT - PERTINENT LABORATORY DATA
06-18    134<L>  |  104  |  7   ----------------------------<  113<H>  3.9   |  22  |  0.92    Ca    8.7      18 Jun 2022 07:48  Phos  2.8     06-18  Mg     2.4     06-18    TPro  6.3  /  Alb  2.1<L>  /  TBili  0.5  /  DBili  x   /  AST  26  /  ALT  26  /  AlkPhos  68  06-18

## 2022-06-18 NOTE — DIETITIAN INITIAL EVALUATION ADULT - ADD RECOMMEND
1.Continue DASH diet.  2. MVI w/ minerals daily to ensure 100% RDA met   3. Monitor bowel movements, if no BM for >3 days, consider implementing bowel regimen.   4. RDN will continue to monitor PO intake, labs, hydration, and wt prn.

## 2022-06-18 NOTE — CONSULT NOTE ADULT - SUBJECTIVE AND OBJECTIVE BOX
Patient is a 66y old  Male who presents with a chief complaint of Weakness and LUTS    HPI:  67 y/o male with h/o GERD was admitted on  for weakness, urinary frequency, urge incontinence x 2 weeks. Patient reports he went to see Dr. Ferguson (PCP) on the day of admission for this problems and was told to come to the ER. Pt reports he noticed increased weakness to his lower extremities (having trouble standing up and walking, with shuffling gait) for the past 2 days. Patient has nocturia (Voids 3-4x) a night for past 5 years. Spouse notes patient with increased confusion and short term memory loss in the past week. Denies fever, chills, HA, recent falls. In the ED patient VS were temp 100.4F and received ceftriaxone.     PMH: as above  PSH: as above  Meds: per reconciliation sheet, noted below  MEDICATIONS  (STANDING):  enoxaparin Injectable 40 milliGRAM(s) SubCutaneous every 24 hours  folic acid 1 milliGRAM(s) Oral daily  influenza  Vaccine (HIGH DOSE) 0.7 milliLiter(s) IntraMuscular once  pantoprazole    Tablet 40 milliGRAM(s) Oral before breakfast  piperacillin/tazobactam IVPB.. 3.375 Gram(s) IV Intermittent every 8 hours  thiamine IVPB 500 milliGRAM(s) IV Intermittent every 8 hours    MEDICATIONS  (PRN):  LORazepam   Injectable 2 milliGRAM(s) IV Push every 1 hour PRN Symptom-triggered: each CIWA -Ar score 8 or GREATER  ondansetron Injectable 4 milliGRAM(s) IV Push every 4 hours PRN Nausea and/or Vomiting    Allergies    No Known Allergies  Intolerances    Social: current day smoker (5 cigarettes daily), Chronic ETOH missuse (10+ years), no illegal drugs; no recent travel, no exposure to TB  FAMILY HISTORY:  FH: HTN (hypertension) (Father)  no history of premature cardiovascular disease in first degree relatives    ROS: the patient denies fever, no chills, no HA, no seizures, no dizziness, no sore throat, no nasal congestion, no blurry vision, no CP, no palpitations, no SOB, no cough, no abdominal pain, no diarrhea, no N/V, has urinary frequency, no leg pain, no claudication, no rash, no joint aches, no rectal pain or bleeding, no night sweats  All other systems reviewed and are negative    Vital Signs Last 24 Hrs  T(C): 37.7 (2022 07:25), Max: 38 (2022 14:14)  T(F): 99.8 (2022 07:25), Max: 100.4 (2022 14:14)  HR: 90 (2022 07:25) (80 - 93)  BP: 133/75 (2022 07:25) (111/68 - 152/67)  BP(mean): 83 (2022 19:45) (83 - 88)  RR: 19 (2022 07:25) (15 - 19)  SpO2: 93% (2022 07:25) (93% - 98%)  Daily Height in cm: 170.18 (2022 13:42)    Daily     PE:    Constitutional:  No acute distress  HEENT: NC/AT, EOMI, PERRLA, conjunctivae clear; ears and nose atraumatic; pharynx benign  Neck: supple; thyroid not palpable  Back: no tenderness  Respiratory: respiratory effort normal; crackles at left base  Cardiovascular: S1S2 regular, no murmurs  Abdomen: soft, not tender, not distended, positive BS; no liver or spleen organomegaly  Genitourinary: no suprapubic tenderness  Lymphatic: no LN palpable  Musculoskeletal: no muscle tenderness, no joint swelling or tenderness  Extremities: no pedal edema  Neurological/ Psychiatric: AxOx3, judgement and insight impaired; moving all extremities  Skin: no rashes; no palpable lesions    Labs: all available labs reviewed                        15.7   8.48  )-----------( 157      ( 2022 15:07 )             46.8     06-18    134<L>  |  104  |  7   ----------------------------<  113<H>  3.9   |  22  |  0.92    Ca    8.7      2022 07:48  Phos  2.8     06-18  Mg     2.4     06-18    TPro  6.3  /  Alb  2.1<L>  /  TBili  0.5  /  DBili  x   /  AST  26  /  ALT  26  /  AlkPhos  68  06-18     LIVER FUNCTIONS - ( 2022 07:48 )  Alb: 2.1 g/dL / Pro: 6.3 gm/dL / ALK PHOS: 68 U/L / ALT: 26 U/L / AST: 26 U/L / GGT: x           Urinalysis Basic - ( 2022 15:07 )    Color: Yellow / Appearance: Clear / S.015 / pH: x  Gluc: x / Ketone: Negative  / Bili: Negative / Urobili: 1   Blood: x / Protein: 100 / Nitrite: Negative   Leuk Esterase: Trace / RBC: 6-10 /HPF / WBC 0-2   Sq Epi: x / Non Sq Epi: Occasional / Bacteria: Moderate    ( @ 15:07)  NotDetec    Radiology: all available radiological tests reviewed    < from: Xray Chest 1 View- PORTABLE-Urgent (22 @ 15:43) >  IMPRESSION:   LEFT mid zone peripheral focal airspace disease.  < end of copied text >      Advanced directives addressed: full resuscitation Patient is a 66y old  Male who presents with a chief complaint of Weakness and LUTS    HPI:  67 y/o male with h/o GERD was admitted on  for weakness, urinary frequency, urge incontinence x 2 weeks. Patient reports he went to see Dr. Ferguson (PCP) on the day of admission for this problems and was told to come to the ER. Pt reports he noticed increased weakness to his lower extremities (having trouble standing up and walking, with shuffling gait) for the past 2 days. Patient has nocturia (Voids 3-4x) a night for past 5 years. Spouse notes patient with increased confusion and short term memory loss in the past week. Denies fever, chills, HA, recent falls. In the ED patient VS were temp 100.4F and received ceftriaxone.     PMH: as above  PSH: as above  Meds: per reconciliation sheet, noted below  MEDICATIONS  (STANDING):  enoxaparin Injectable 40 milliGRAM(s) SubCutaneous every 24 hours  folic acid 1 milliGRAM(s) Oral daily  influenza  Vaccine (HIGH DOSE) 0.7 milliLiter(s) IntraMuscular once  pantoprazole    Tablet 40 milliGRAM(s) Oral before breakfast  piperacillin/tazobactam IVPB.. 3.375 Gram(s) IV Intermittent every 8 hours  thiamine IVPB 500 milliGRAM(s) IV Intermittent every 8 hours    MEDICATIONS  (PRN):  LORazepam   Injectable 2 milliGRAM(s) IV Push every 1 hour PRN Symptom-triggered: each CIWA -Ar score 8 or GREATER  ondansetron Injectable 4 milliGRAM(s) IV Push every 4 hours PRN Nausea and/or Vomiting    Allergies    No Known Allergies  Intolerances    Social: current day smoker (5 cigarettes daily), Chronic ETOH missuse (10+ years), no illegal drugs; no recent travel, no exposure to TB  FAMILY HISTORY:  FH: HTN (hypertension) (Father)  no history of premature cardiovascular disease in first degree relatives    ROS: the patient is confused; limited; poorly verbal  All other systems reviewed and are negative    Vital Signs Last 24 Hrs  T(C): 37.7 (2022 07:25), Max: 38 (2022 14:14)  T(F): 99.8 (2022 07:25), Max: 100.4 (2022 14:14)  HR: 90 (2022 07:25) (80 - 93)  BP: 133/75 (2022 07:25) (111/68 - 152/67)  BP(mean): 83 (2022 19:45) (83 - 88)  RR: 19 (2022 07:25) (15 - 19)  SpO2: 93% (2022 07:25) (93% - 98%)  Daily Height in cm: 170.18 (2022 13:42)    Daily     PE:    Constitutional:  No acute distress  HEENT: NC/AT, EOMI, PERRLA, conjunctivae clear; ears and nose atraumatic; pharynx benign  Neck: supple; thyroid not palpable  Back: no tenderness  Respiratory: respiratory effort normal; crackles at left base  Cardiovascular: S1S2 regular, no murmurs  Abdomen: soft, not tender, not distended, positive BS; no liver or spleen organomegaly  Genitourinary: no suprapubic tenderness  Lymphatic: no LN palpable  Musculoskeletal: no muscle tenderness, no joint swelling or tenderness  Extremities: no pedal edema  Neurological/ Psychiatric: Alert, judgement and insight impaired; moving all extremities  Skin: no rashes; no palpable lesions    Labs: all available labs reviewed                        15.7   8.48  )-----------( 157      ( 2022 15:07 )             46.8     06-18    134<L>  |  104  |  7   ----------------------------<  113<H>  3.9   |  22  |  0.92    Ca    8.7      2022 07:48  Phos  2.8     06-18  Mg     2.4     06-18    TPro  6.3  /  Alb  2.1<L>  /  TBili  0.5  /  DBili  x   /  AST  26  /  ALT  26  /  AlkPhos  68  06-18     LIVER FUNCTIONS - ( 2022 07:48 )  Alb: 2.1 g/dL / Pro: 6.3 gm/dL / ALK PHOS: 68 U/L / ALT: 26 U/L / AST: 26 U/L / GGT: x           Urinalysis Basic - ( 2022 15:07 )    Color: Yellow / Appearance: Clear / S.015 / pH: x  Gluc: x / Ketone: Negative  / Bili: Negative / Urobili: 1   Blood: x / Protein: 100 / Nitrite: Negative   Leuk Esterase: Trace / RBC: 6-10 /HPF / WBC 0-2   Sq Epi: x / Non Sq Epi: Occasional / Bacteria: Moderate    ( @ 15:07)  NotDete    Radiology: all available radiological tests reviewed    < from: Xray Chest 1 View- PORTABLE-Urgent (22 @ 15:43) >  IMPRESSION:   LEFT mid zone peripheral focal airspace disease.  < end of copied text >      Advanced directives addressed: full resuscitation

## 2022-06-18 NOTE — DIETITIAN INITIAL EVALUATION ADULT - PERTINENT MEDS FT
MEDICATIONS  (STANDING):  azithromycin   Tablet 500 milliGRAM(s) Oral daily  cefTRIAXone   IVPB 1000 milliGRAM(s) IV Intermittent every 24 hours  enoxaparin Injectable 40 milliGRAM(s) SubCutaneous every 24 hours  folic acid 1 milliGRAM(s) Oral daily  influenza  Vaccine (HIGH DOSE) 0.7 milliLiter(s) IntraMuscular once  pantoprazole    Tablet 40 milliGRAM(s) Oral before breakfast  thiamine IVPB 500 milliGRAM(s) IV Intermittent every 8 hours    MEDICATIONS  (PRN):  LORazepam   Injectable 2 milliGRAM(s) IV Push every 1 hour PRN Symptom-triggered: each CIWA -Ar score 8 or GREATER  ondansetron Injectable 4 milliGRAM(s) IV Push every 4 hours PRN Nausea and/or Vomiting

## 2022-06-18 NOTE — DIETITIAN INITIAL EVALUATION ADULT - OTHER INFO
65 y/o male with h/o GERD was admitted on 6/17 for weakness, urinary frequency, urge incontinence x 2 weeks. Patient reports he went to see Dr. Ferguson (PCP) on the day of admission for this problems and was told to come to the ER. Pt reports he noticed increased weakness to his lower extremities (having trouble standing up and walking, with shuffling gait) for the past 2 days. Patient has nocturia (Voids 3-4x) a night for past 5 years. Spouse notes patient with increased confusion and short term memory loss in the past week. Adm for fever.    Pt ate eggs, brown, sausage, OJ 50-75% breakfast. UBW: 220# x 1 mo. Admit wt: 113# ?accuracy. RD obtained bedscale wt today 6/18/22: 213#. 7 pound wt loss poss clin sig. NFPE reveals no muscle/fat wasting at this time. Pt on DASH diet. See below recommendations.

## 2022-06-18 NOTE — PROGRESS NOTE ADULT - SUBJECTIVE AND OBJECTIVE BOX
HPI:  Patient is a 67 yo male current day smoker (5 cigarettes daily) and Chronic ETOH missuse (10+ years) with a PMH of GERD who presents with weakness, frequency, urge incontinence x 2 weeks. Patient reports he went to see Dr. Das(PCP) today for this problems and was told to come to the ER. Pt reports he noticed increased weakness to his lower extremities (having trouble standing up and walking, with shuffling gait) for the past 2 days. Patient has a chronic hx of nocturia (Voids 3-4x) a night for past 5 years and has not been evaluated for this medical issue. Spouse notes patient with increased confusion and short term memory loss in the past week. Denies hx of UTIs or ETOH withdrawals. Denies fevers, chills, HA, recent falls, urinary hesitancy. Last etoh drink was yesterday night.     In the ED patient VS were temp 100.4, /71, RR 18, Pulse ox 97. CT head was unremarkable for intracranial bleeding, POCT UA was positive for blood and bacteria. Pt was given 2L of fluids and ceftriaxone.     Subjective:     Vital Signs Last 24 Hrs  T(C): 37.7 (18 Jun 2022 07:25), Max: 38 (17 Jun 2022 14:14)  T(F): 99.8 (18 Jun 2022 07:25), Max: 100.4 (17 Jun 2022 14:14)  HR: 90 (18 Jun 2022 07:25) (80 - 93)  BP: 133/75 (18 Jun 2022 07:25) (111/68 - 152/67)  BP(mean): 83 (17 Jun 2022 19:45) (83 - 88)  RR: 19 (18 Jun 2022 07:25) (15 - 19)  SpO2: 93% (18 Jun 2022 07:25) (93% - 98%)      GENERAL: NAD, lying in bed with tremors  HEAD:  Atraumatic, Normocephalic  EYES: EOMI, PERRLA, conjunctiva and sclera clear  ENT: Moist mucous membranes  NECK: Supple, No JVD  Pulm: Clear to auscultation bilaterally; No rales, rhonchi, wheezing, or rubs. Unlabored respirations  Cardio: Regular rate and rhythm; No murmurs, rubs, or gallops  Gastro: Bowel sounds present; Soft, Nontender, Nondistended. No hepatomegally  EXTREMITIES:  2+ Peripheral Pulses, brisk capillary refill. No clubbing, cyanosis, or edema  Genit/uro: MAN Boggy but nonenlarged/nontender prostate  NERVOUS SYSTEM:  Alert & Orientedx3 speech clear. No deficits   MSK: FROM all 4 extremities, 4/5 strength. Unstable on feet  SKIN: No rashes or lesions    MEDICATIONS:  MEDICATIONS  (STANDING):  enoxaparin Injectable 40 milliGRAM(s) SubCutaneous every 24 hours  folic acid 1 milliGRAM(s) Oral daily  influenza  Vaccine (HIGH DOSE) 0.7 milliLiter(s) IntraMuscular once  pantoprazole    Tablet 40 milliGRAM(s) Oral before breakfast  piperacillin/tazobactam IVPB.. 3.375 Gram(s) IV Intermittent every 8 hours  thiamine IVPB 500 milliGRAM(s) IV Intermittent every 8 hours    MEDICATIONS  (PRN):  LORazepam   Injectable 2 milliGRAM(s) IV Push every 1 hour PRN Symptom-triggered: each CIWA -Ar score 8 or GREATER  ondansetron Injectable 4 milliGRAM(s) IV Push every 4 hours PRN Nausea and/or Vomiting      LABS: All Labs Reviewed:                        15.7   8.48  )-----------( 157      ( 17 Jun 2022 15:07 )             46.8     06-18    134<L>  |  104  |  7   ----------------------------<  113<H>  3.9   |  22  |  0.92    Ca    8.7      18 Jun 2022 07:48  Phos  2.8     06-18  Mg     2.4     06-18    TPro  6.3  /  Alb  2.1<L>  /  TBili  0.5  /  DBili  x   /  AST  26  /  ALT  26  /  AlkPhos  68  06-18    PT/INR - ( 17 Jun 2022 15:07 )   PT: 11.0 sec;   INR: 0.95 ratio         PTT - ( 17 Jun 2022 15:07 )  PTT:27.7 sec  CARDIAC MARKERS ( 17 Jun 2022 15:07 )  x     / x     / 104 U/L / x     / x          Blood Culture:   I&O's Summary    CAPILLARY BLOOD GLUCOSE      EKG/RADIOLOGY  < from: Xray Chest 1 View- PORTABLE-Urgent (06.17.22 @ 15:43) >    IMPRESSION:   LEFT mid zone peripheral focal airspace disease. Follow-up   chest radiograph recommended.    --- End of Report ---    < end of copied text >    < from: CT Head No Cont (06.17.22 @ 15:26) >    IMPRESSION:  Minimal chronic microvascular changes without evidence ofan   acute transcortical infarction or hemorrhage.    < end of copied text >   HPI:  Patient is a 65 yo male current day smoker (5 cigarettes daily) and Chronic ETOH missuse (10+ years) with a PMH of GERD who presents with weakness, frequency, urge incontinence x 2 weeks. Patient reports he went to see Dr. Das(PCP) today for this problems and was told to come to the ER. Pt reports he noticed increased weakness to his lower extremities (having trouble standing up and walking, with shuffling gait) for the past 2 days. Patient has a chronic hx of nocturia (Voids 3-4x) a night for past 5 years and has not been evaluated for this medical issue. Spouse notes patient with increased confusion and short term memory loss in the past week. Denies hx of UTIs or ETOH withdrawals. Denies fevers, chills, HA, recent falls, urinary hesitancy. Last etoh drink was yesterday night.     In the ED patient VS were temp 100.4, /71, RR 18, Pulse ox 97. CT head was unremarkable for intracranial bleeding, POCT UA was positive for blood and bacteria. Pt was given 2L of fluids and ceftriaxone.     Subjective: No events since ED. Pt reports no fever but still pleasantly confused. No ha, dizziness, cp, palpitations, n/v    Vital Signs Last 24 Hrs  T(C): 37.7 (18 Jun 2022 07:25), Max: 38 (17 Jun 2022 14:14)  T(F): 99.8 (18 Jun 2022 07:25), Max: 100.4 (17 Jun 2022 14:14)  HR: 90 (18 Jun 2022 07:25) (80 - 93)  BP: 133/75 (18 Jun 2022 07:25) (111/68 - 152/67)  BP(mean): 83 (17 Jun 2022 19:45) (83 - 88)  RR: 19 (18 Jun 2022 07:25) (15 - 19)  SpO2: 93% (18 Jun 2022 07:25) (93% - 98%)      GENERAL: NAD, lying in bed, no tremors  HEAD:  Atraumatic, Normocephalic  EYES: EOMI, PERRLA, conjunctiva and sclera clear  ENT: Moist mucous membranes  NECK: Supple, No JVD  Pulm: Clear to auscultation bilaterally; No rales, rhonchi, wheezing, or rubs. Unlabored respirations  Cardio: Regular rate and rhythm; No murmurs, rubs, or gallops  Gastro: Bowel sounds present; Soft, Nontender, Nondistended. No hepatomegally  EXTREMITIES:  2+ Peripheral Pulses, brisk capillary refill. No clubbing, cyanosis, or edema  NERVOUS SYSTEM:  Alert & Orientedx3 speech clear. No deficits   MSK: FROM all 4 extremities, 4/5 strength. Unstable on feet  SKIN: No rashes or lesions    MEDICATIONS:  MEDICATIONS  (STANDING):  enoxaparin Injectable 40 milliGRAM(s) SubCutaneous every 24 hours  folic acid 1 milliGRAM(s) Oral daily  influenza  Vaccine (HIGH DOSE) 0.7 milliLiter(s) IntraMuscular once  pantoprazole    Tablet 40 milliGRAM(s) Oral before breakfast  piperacillin/tazobactam IVPB.. 3.375 Gram(s) IV Intermittent every 8 hours  thiamine IVPB 500 milliGRAM(s) IV Intermittent every 8 hours    MEDICATIONS  (PRN):  LORazepam   Injectable 2 milliGRAM(s) IV Push every 1 hour PRN Symptom-triggered: each CIWA -Ar score 8 or GREATER  ondansetron Injectable 4 milliGRAM(s) IV Push every 4 hours PRN Nausea and/or Vomiting      LABS: All Labs Reviewed:                        15.7   8.48  )-----------( 157      ( 17 Jun 2022 15:07 )             46.8     06-18    134<L>  |  104  |  7   ----------------------------<  113<H>  3.9   |  22  |  0.92    Ca    8.7      18 Jun 2022 07:48  Phos  2.8     06-18  Mg     2.4     06-18    TPro  6.3  /  Alb  2.1<L>  /  TBili  0.5  /  DBili  x   /  AST  26  /  ALT  26  /  AlkPhos  68  06-18    PT/INR - ( 17 Jun 2022 15:07 )   PT: 11.0 sec;   INR: 0.95 ratio         PTT - ( 17 Jun 2022 15:07 )  PTT:27.7 sec  CARDIAC MARKERS ( 17 Jun 2022 15:07 )  x     / x     / 104 U/L / x     / x          Blood Culture:   I&O's Summary    CAPILLARY BLOOD GLUCOSE      EKG/RADIOLOGY  < from: Xray Chest 1 View- PORTABLE-Urgent (06.17.22 @ 15:43) >    IMPRESSION:   LEFT mid zone peripheral focal airspace disease. Follow-up   chest radiograph recommended.    --- End of Report ---    < end of copied text >    < from: CT Head No Cont (06.17.22 @ 15:26) >    IMPRESSION:  Minimal chronic microvascular changes without evidence ofan   acute transcortical infarction or hemorrhage.    < end of copied text >

## 2022-06-19 DIAGNOSIS — R39.9 UNSPECIFIED SYMPTOMS AND SIGNS INVOLVING THE GENITOURINARY SYSTEM: ICD-10-CM

## 2022-06-19 LAB
ALBUMIN SERPL ELPH-MCNC: 2.2 G/DL — LOW (ref 3.3–5)
ALP SERPL-CCNC: 75 U/L — SIGNIFICANT CHANGE UP (ref 40–120)
ALT FLD-CCNC: 33 U/L — SIGNIFICANT CHANGE UP (ref 12–78)
ANION GAP SERPL CALC-SCNC: 9 MMOL/L — SIGNIFICANT CHANGE UP (ref 5–17)
AST SERPL-CCNC: 51 U/L — HIGH (ref 15–37)
BILIRUB SERPL-MCNC: 0.5 MG/DL — SIGNIFICANT CHANGE UP (ref 0.2–1.2)
BUN SERPL-MCNC: 7 MG/DL — SIGNIFICANT CHANGE UP (ref 7–23)
CALCIUM SERPL-MCNC: 8.8 MG/DL — SIGNIFICANT CHANGE UP (ref 8.5–10.1)
CHLORIDE SERPL-SCNC: 100 MMOL/L — SIGNIFICANT CHANGE UP (ref 96–108)
CO2 SERPL-SCNC: 21 MMOL/L — LOW (ref 22–31)
CREAT SERPL-MCNC: 0.86 MG/DL — SIGNIFICANT CHANGE UP (ref 0.5–1.3)
EGFR: 96 ML/MIN/1.73M2 — SIGNIFICANT CHANGE UP
GLUCOSE SERPL-MCNC: 127 MG/DL — HIGH (ref 70–99)
HCT VFR BLD CALC: 42.4 % — SIGNIFICANT CHANGE UP (ref 39–50)
HGB BLD-MCNC: 14.5 G/DL — SIGNIFICANT CHANGE UP (ref 13–17)
MCHC RBC-ENTMCNC: 32.7 PG — SIGNIFICANT CHANGE UP (ref 27–34)
MCHC RBC-ENTMCNC: 34.2 GM/DL — SIGNIFICANT CHANGE UP (ref 32–36)
MCV RBC AUTO: 95.7 FL — SIGNIFICANT CHANGE UP (ref 80–100)
PLATELET # BLD AUTO: 145 K/UL — LOW (ref 150–400)
POTASSIUM SERPL-MCNC: 4.1 MMOL/L — SIGNIFICANT CHANGE UP (ref 3.5–5.3)
POTASSIUM SERPL-SCNC: 4.1 MMOL/L — SIGNIFICANT CHANGE UP (ref 3.5–5.3)
PROT SERPL-MCNC: 7 GM/DL — SIGNIFICANT CHANGE UP (ref 6–8.3)
RBC # BLD: 4.43 M/UL — SIGNIFICANT CHANGE UP (ref 4.2–5.8)
RBC # FLD: 13.6 % — SIGNIFICANT CHANGE UP (ref 10.3–14.5)
SODIUM SERPL-SCNC: 130 MMOL/L — LOW (ref 135–145)
WBC # BLD: 5.41 K/UL — SIGNIFICANT CHANGE UP (ref 3.8–10.5)
WBC # FLD AUTO: 5.41 K/UL — SIGNIFICANT CHANGE UP (ref 3.8–10.5)

## 2022-06-19 PROCEDURE — 99222 1ST HOSP IP/OBS MODERATE 55: CPT

## 2022-06-19 PROCEDURE — 99232 SBSQ HOSP IP/OBS MODERATE 35: CPT

## 2022-06-19 PROCEDURE — 93010 ELECTROCARDIOGRAM REPORT: CPT

## 2022-06-19 PROCEDURE — 99233 SBSQ HOSP IP/OBS HIGH 50: CPT | Mod: GC

## 2022-06-19 RX ADMIN — ENOXAPARIN SODIUM 40 MILLIGRAM(S): 100 INJECTION SUBCUTANEOUS at 23:46

## 2022-06-19 RX ADMIN — Medication 2 MILLIGRAM(S): at 22:08

## 2022-06-19 RX ADMIN — Medication 2 MILLIGRAM(S): at 14:17

## 2022-06-19 RX ADMIN — Medication 2 MILLIGRAM(S): at 06:07

## 2022-06-19 RX ADMIN — Medication 255 MILLIGRAM(S): at 21:33

## 2022-06-19 RX ADMIN — Medication 255 MILLIGRAM(S): at 05:44

## 2022-06-19 RX ADMIN — AZITHROMYCIN 500 MILLIGRAM(S): 500 TABLET, FILM COATED ORAL at 10:21

## 2022-06-19 RX ADMIN — Medication 255 MILLIGRAM(S): at 14:17

## 2022-06-19 RX ADMIN — PANTOPRAZOLE SODIUM 40 MILLIGRAM(S): 20 TABLET, DELAYED RELEASE ORAL at 10:21

## 2022-06-19 RX ADMIN — Medication 2 MILLIGRAM(S): at 17:53

## 2022-06-19 RX ADMIN — CEFTRIAXONE 100 MILLIGRAM(S): 500 INJECTION, POWDER, FOR SOLUTION INTRAMUSCULAR; INTRAVENOUS at 08:39

## 2022-06-19 RX ADMIN — Medication 2 MILLIGRAM(S): at 02:18

## 2022-06-19 RX ADMIN — Medication 2 MILLIGRAM(S): at 10:30

## 2022-06-19 NOTE — PROGRESS NOTE ADULT - SUBJECTIVE AND OBJECTIVE BOX
Date of service: 22 @ 09:55    Lying in bed in NAD  Lethargic  Poorly verbal    ROS: no fever or chills; unobtainable    MEDICATIONS  (STANDING):  azithromycin   Tablet 500 milliGRAM(s) Oral daily  cefTRIAXone   IVPB 1000 milliGRAM(s) IV Intermittent every 24 hours  enoxaparin Injectable 40 milliGRAM(s) SubCutaneous every 24 hours  folic acid 1 milliGRAM(s) Oral daily  influenza  Vaccine (HIGH DOSE) 0.7 milliLiter(s) IntraMuscular once  LORazepam   Injectable   IV Push   LORazepam   Injectable 2 milliGRAM(s) IV Push every 4 hours  pantoprazole    Tablet 40 milliGRAM(s) Oral before breakfast  thiamine IVPB 500 milliGRAM(s) IV Intermittent every 8 hours    Vital Signs Last 24 Hrs  T(C): 37 (2022 09:00), Max: 37.9 (2022 23:21)  T(F): 98.6 (2022 09:00), Max: 100.2 (2022 23:21)  HR: 98 (2022 09:00) (97 - 110)  BP: 129/71 (2022 09:00) (129/71 - 173/82)  BP(mean): --  RR: 18 (2022 09:00) (17 - 19)  SpO2: 93% (2022 09:00) (93% - 96%)     Physical exam:    Constitutional:  No acute distress  HEENT: NC/AT, EOMI, PERRLA, conjunctivae clear; ears and nose atraumatic  Neck: supple; thyroid not palpable  Back: no tenderness  Respiratory: respiratory effort normal; crackles at left base  Cardiovascular: S1S2 regular, no murmurs  Abdomen: soft, not tender, not distended, positive BS  Genitourinary: no suprapubic tenderness  Lymphatic: no LN palpable  Musculoskeletal: no muscle tenderness, no joint swelling or tenderness  Extremities: no pedal edema  Neurological/ Psychiatric: Alert, moving all extremities  Skin: no rashes; no palpable lesions    Labs: reviewed                        14.5   5.41  )-----------( 145      ( 2022 08:19 )             42.4     -    130<L>  |  100  |  7   ----------------------------<  127<H>  4.1   |  21<L>  |  0.86    Ca    8.8      2022 08:19  Phos  3.1     06-19  Mg     2.5     06-19    TPro  7.0  /  Alb  2.2<L>  /  TBili  0.5  /  DBili  0.2  /  AST  51<H>  /  ALT  33  /  AlkPhos  75  06-19                        15.7   8.48  )-----------( 157      ( 2022 15:07 )             46.8     06-18    134<L>  |  104  |  7   ----------------------------<  113<H>  3.9   |  22  |  0.92    Ca    8.7      2022 07:48  Phos  2.8     06-18  Mg     2.4     06-18    TPro  6.3  /  Alb  2.1<L>  /  TBili  0.5  /  DBili  x   /  AST  26  /  ALT  26  /  AlkPhos  68  06-18     LIVER FUNCTIONS - ( 2022 07:48 )  Alb: 2.1 g/dL / Pro: 6.3 gm/dL / ALK PHOS: 68 U/L / ALT: 26 U/L / AST: 26 U/L / GGT: x           Urinalysis Basic - ( 2022 15:07 )    Color: Yellow / Appearance: Clear / S.015 / pH: x  Gluc: x / Ketone: Negative  / Bili: Negative / Urobili: 1   Blood: x / Protein: 100 / Nitrite: Negative   Leuk Esterase: Trace / RBC: 6-10 /HPF / WBC 0-2   Sq Epi: x / Non Sq Epi: Occasional / Bacteria: Moderate    ( @ 15:07)  NotDetec      Culture - Urine (collected 2022 15:07)  Source: Clean Catch None  Final Report (2022 19:18):    <10,000 CFU/mL Normal Urogenital Jaqueline    Culture - Blood (collected 2022 15:07)  Source: .Blood None  Preliminary Report (2022 22:01):    No growth to date.    Culture - Blood (collected 2022 15:07)  Source: .Blood None  Preliminary Report (2022 22:01):    No growth to date.    Radiology: all available radiological tests reviewed    < from: Xray Chest 1 View- PORTABLE-Urgent (22 @ 15:43) >  IMPRESSION:   LEFT mid zone peripheral focal airspace disease.  < end of copied text >      Advanced directives addressed: full resuscitation

## 2022-06-19 NOTE — PROGRESS NOTE ADULT - ASSESSMENT
Patient is a 67 yo male current day smoker (5 cigarettes daily) and Chronic ETOH missuse (10+ years) with a PMH of GERD who presents with weakness, frequency, urge incontinence x 2 weeks.     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 Consider cystoscopy if symptoms persists  -Low threshold to order CT abdomen/pelvis if spikes another temp     #Confusion 2/2 Wernicke's  #Hx of ETOH Abuse  - Pt w/long hx of ETOH abuse, now confused,  ETOH level less than 10 on admission, CIWAs 0-2  - Continue ativan taper  - C/w thiamine 500mg IV TID and folate, will switch to Thiamine 250mg IM Qd x 5 days tomorrow  - Neurology consult appreciated; cont abx, cont thiamine supplementation    #GERD  -C/w Omeprazole PO daily     #Code status  -Full Code    #DVT pppx  -Lovenox 40U Daily     Case discussed with Dr. Cui

## 2022-06-19 NOTE — CONSULT NOTE ADULT - ASSESSMENT
65 y/o male with h/o GERD was admitted on 6/17 for weakness, urinary frequency, urge incontinence x 2 weeks. Patient reports he went to see Dr. Ferguson (PCP) on the day of admission for this problems and was told to come to the ER. Pt reports he noticed increased weakness to his lower extremities (having trouble standing up and walking, with shuffling gait) for the past 2 days. Patient has nocturia (Voids 3-4x) a night for past 5 years. Spouse notes patient with increased confusion and short term memory loss in the past week. Denies fever, chills, HA, recent falls. In the ED patient VS were temp 100.4F and received ceftriaxone.     1. Low grade fever. Dysuria. Probable UTI and/or prostatitis.  Possible LLL pneumonia.   -encephalopathy  -obtain BC x 2, urine c/s  -start ceftriaxone 1 gm IV qd and azithromycin 500 mg PO qd  -reason for abx use and side effects reviewed with patient; monitor BMP   -old chart reviewed to assess prior cultures  -monitor temps  -f/u CBC  -supportive care  2. Other issues:   -care per medicine    
Mr. Valladares is a 66 year old man who reports that for approximately two weeks he has had weakness in his lower extremities.  He describes a loss of mobility and states that his wife reports that he is shuffling when walking.  His wife has also reported problems with memory. He admits to word finding difficulty.  He also reports increased urinary frequency and urgency.    He has recently been cutting back on alcohol intake. He used to have ~ 2 shots every day and is now having 2 shots on some nights.    He had a temperature of 100.4 in the ED.    Memory impairment/gait difficulty/urinary frequency  -CT head not clearly suggestive of NPH  -Will get MRI brain with and without contrast for these new unexplained symptoms  -He does have some low back pain and gait impairment. Can get MRI lumbar spine as well.  -Check TSH, vitamin B12 levels  -Monitor for any ETOH withdrawal.    Dr. Welch will take over neurology service on 6/18/22 and can follow-up.  Discussed with ARIELA Barron.  
Continue antibiotics per ID.   Will start Flomax.     Follow up as out patient. Will consider Cystoscopy if symptoms persists.

## 2022-06-19 NOTE — CONSULT NOTE ADULT - SUBJECTIVE AND OBJECTIVE BOX
CHIEF COMPLAINT:    HISTORY OF PRESENT ILLNESS:  67 yo male admitted to the hospital after presenting with weakness, frequency, urge incontinence x 2 weeks. Being treated for presumed UTI/Prostatitis and Pneumonia with Ceftriaxone and Azithromycin.   Patient has a chronic h/o nocturia 3-4 x.   Current day smoker (5 cigarettes daily) and Chronic ETOH abuse.   Pt on CIWA protocol. Not oriented at the time of visit.   History obtained by chart review.     PAST MEDICAL & SURGICAL HISTORY:  No pertinent past surgical history          REVIEW OF SYSTEMS:  Unable to obtain due to patient condition     MEDICATIONS  (STANDING):  azithromycin   Tablet 500 milliGRAM(s) Oral daily  cefTRIAXone   IVPB 1000 milliGRAM(s) IV Intermittent every 24 hours  enoxaparin Injectable 40 milliGRAM(s) SubCutaneous every 24 hours  folic acid 1 milliGRAM(s) Oral daily  influenza  Vaccine (HIGH DOSE) 0.7 milliLiter(s) IntraMuscular once  LORazepam   Injectable   IV Push   LORazepam   Injectable 2 milliGRAM(s) IV Push every 4 hours  pantoprazole    Tablet 40 milliGRAM(s) Oral before breakfast  thiamine IVPB 500 milliGRAM(s) IV Intermittent every 8 hours    MEDICATIONS  (PRN):  LORazepam   Injectable 2 milliGRAM(s) IV Push every 1 hour PRN Symptom-triggered: each CIWA -Ar score 8 or GREATER  LORazepam   Injectable 2 milliGRAM(s) IV Push every 2 hours PRN Symptom-triggered: 2 point increase in CIWA -Ar score and a total score of 7 or LESS  ondansetron Injectable 4 milliGRAM(s) IV Push every 4 hours PRN Nausea and/or Vomiting      Allergies    No Known Allergies    Intolerances        SOCIAL HISTORY:    FAMILY HISTORY:  FH: HTN (hypertension) (Father)        Vital Signs Last 24 Hrs  T(C): 37 (2022 09:00), Max: 37.9 (2022 23:21)  T(F): 98.6 (2022 09:00), Max: 100.2 (2022 23:21)  HR: 98 (2022 09:00) (97 - 110)  BP: 129/71 (2022 09:00) (129/71 - 173/82)  BP(mean): --  RR: 18 (2022 09:00) (17 - 19)  SpO2: 93% (2022 09:00) (93% - 96%)    PHYSICAL EXAM:    Neck: Supple  Respiratory: Normal respiratory effort    Cardiovascular: Normal peripheral circulation   Abd: Soft, non distended, non tender  Extremities: No peripheral edema, in restraints       LABS:                        14.5   5.41  )-----------( 145      ( 2022 08:19 )             42.4     06-19    130<L>  |  100  |  7   ----------------------------<  127<H>  4.1   |  21<L>  |  0.86    Ca    8.8      2022 08:19  Phos  3.1     06-  Mg     2.5     -19    TPro  7.0  /  Alb  2.2<L>  /  TBili  0.5  /  DBili  0.2  /  AST  51<H>  /  ALT  33  /  AlkPhos  75  06-19    PT/INR - ( 2022 15:07 )   PT: 11.0 sec;   INR: 0.95 ratio         PTT - ( 2022 15:07 )  PTT:27.7 sec  Urinalysis Basic - ( 2022 15:07 )    Color: Yellow / Appearance: Clear / S.015 / pH: x  Gluc: x / Ketone: Negative  / Bili: Negative / Urobili: 1   Blood: x / Protein: 100 / Nitrite: Negative   Leuk Esterase: Trace / RBC: 6-10 /HPF / WBC 0-2   Sq Epi: x / Non Sq Epi: Occasional / Bacteria: Moderate

## 2022-06-19 NOTE — PROGRESS NOTE ADULT - SUBJECTIVE AND OBJECTIVE BOX
HPI:  Patient is a 67 yo male current day smoker (5 cigarettes daily) and Chronic ETOH missuse (10+ years) with a PMH of GERD who presents with weakness, frequency, urge incontinence x 2 weeks. Patient reports he went to see Dr. Das(PCP) today for this problems and was told to come to the ER. Pt reports he noticed increased weakness to his lower extremities (having trouble standing up and walking, with shuffling gait) for the past 2 days. Patient has a chronic hx of nocturia (Voids 3-4x) a night for past 5 years and has not been evaluated for this medical issue. Spouse notes patient with increased confusion and short term memory loss in the past week. Denies hx of UTIs or ETOH withdrawals. Denies fevers, chills, HA, recent falls, urinary hesitancy. Last etoh drink was yesterday night.     In the ED patient VS were temp 100.4, /71, RR 18, Pulse ox 97. CT head was unremarkable for intracranial bleeding, POCT UA was positive for blood and bacteria. Pt was given 2L of fluids and ceftriaxone.     6/19/22: Pt seen at evaluated at bedside. Place on CIWA ativan taper overnight for increased agitation. Pt opens eyes to name being called but sleepy. Pt's wife, Beatrice at bedside.     Vital Signs Last 24 Hrs  T(C): 37.7 (19 Jun 2022 15:00), Max: 37.9 (18 Jun 2022 23:21)  T(F): 99.9 (19 Jun 2022 15:00), Max: 100.2 (18 Jun 2022 23:21)  HR: 104 (19 Jun 2022 15:00) (97 - 110)  BP: 157/76 (19 Jun 2022 15:00) (129/71 - 173/82)  BP(mean): --  RR: 18 (19 Jun 2022 15:00) (17 - 19)  SpO2: 92% (19 Jun 2022 15:00) (89% - 95%)      GENERAL: NAD, lying in bed, no tremors  HEAD:  Atraumatic, Normocephalic  EYES: EOMI, PERRLA, conjunctiva and sclera clear  ENT: Moist mucous membranes  NECK: Supple, No JVD  Pulm: Clear to auscultation bilaterally; No rales, rhonchi, wheezing, or rubs. Unlabored respirations  Cardio: Regular rate and rhythm; No murmurs, rubs, or gallops  Gastro: Bowel sounds present; Soft, Nontender, Nondistended. No hepatomegally  EXTREMITIES:  2+ Peripheral Pulses, brisk capillary refill. No clubbing, cyanosis, or edema  NERVOUS SYSTEM:  Alert & Orientedx3 speech clear. No deficits   MSK: FROM all 4 extremities, 4/5 strength. Unstable on feet  SKIN: No rashes or lesions    MEDICATIONS:  MEDICATIONS  (STANDING):  enoxaparin Injectable 40 milliGRAM(s) SubCutaneous every 24 hours  folic acid 1 milliGRAM(s) Oral daily  influenza  Vaccine (HIGH DOSE) 0.7 milliLiter(s) IntraMuscular once  pantoprazole    Tablet 40 milliGRAM(s) Oral before breakfast  piperacillin/tazobactam IVPB.. 3.375 Gram(s) IV Intermittent every 8 hours  thiamine IVPB 500 milliGRAM(s) IV Intermittent every 8 hours    MEDICATIONS  (PRN):  LORazepam   Injectable 2 milliGRAM(s) IV Push every 1 hour PRN Symptom-triggered: each CIWA -Ar score 8 or GREATER  ondansetron Injectable 4 milliGRAM(s) IV Push every 4 hours PRN Nausea and/or Vomiting      LABS: All Labs Reviewed:                                   14.5   5.41  )-----------( 145      ( 19 Jun 2022 08:19 )             42.4     06-19    130<L>  |  100  |  7   ----------------------------<  127<H>  4.1   |  21<L>  |  0.86    Ca    8.8      19 Jun 2022 08:19  Phos  3.1     06-19  Mg     2.5     06-19    TPro  7.0  /  Alb  2.2<L>  /  TBili  0.5  /  DBili  0.2  /  AST  51<H>  /  ALT  33  /  AlkPhos  75  06-19      Blood Culture:   I&O's Summary    CAPILLARY BLOOD GLUCOSE      EKG/RADIOLOGY  < from: Xray Chest 1 View- PORTABLE-Urgent (06.17.22 @ 15:43) >    IMPRESSION:   LEFT mid zone peripheral focal airspace disease. Follow-up   chest radiograph recommended.    --- End of Report ---    < end of copied text >    < from: CT Head No Cont (06.17.22 @ 15:26) >    IMPRESSION:  Minimal chronic microvascular changes without evidence ofan   acute transcortical infarction or hemorrhage.    < end of copied text >

## 2022-06-19 NOTE — PROGRESS NOTE ADULT - SUBJECTIVE AND OBJECTIVE BOX
HPI:  Patient is a 67 yo male current day smoker (5 cigarettes daily) and Chronic ETOH missuse (10+ years) with a PMH of GERD who presents with weakness, frequency, urge incontinence x 2 weeks.  Spouse noted patient with increased confusion and short term memory loss in the past week. Last etoh drink was evening before admission.  . CT head was unremarkable for intracranial bleeding, POCT UA was positive for blood and bacteria. Pt was given 2L of fluids and ceftriaxone.   Presntly sedated, agitated at night.             MEDICATIONS  (STANDING):  azithromycin   Tablet 500 milliGRAM(s) Oral daily  cefTRIAXone   IVPB 1000 milliGRAM(s) IV Intermittent every 24 hours  enoxaparin Injectable 40 milliGRAM(s) SubCutaneous every 24 hours  folic acid 1 milliGRAM(s) Oral daily  influenza  Vaccine (HIGH DOSE) 0.7 milliLiter(s) IntraMuscular once  LORazepam   Injectable   IV Push   LORazepam   Injectable 2 milliGRAM(s) IV Push every 4 hours  pantoprazole    Tablet 40 milliGRAM(s) Oral before breakfast  thiamine IVPB 500 milliGRAM(s) IV Intermittent every 8 hours    MEDICATIONS  (PRN):  LORazepam   Injectable 2 milliGRAM(s) IV Push every 1 hour PRN Symptom-triggered: each CIWA -Ar score 8 or GREATER  LORazepam   Injectable 2 milliGRAM(s) IV Push every 2 hours PRN Symptom-triggered: 2 point increase in CIWA -Ar score and a total score of 7 or LESS  ondansetron Injectable 4 milliGRAM(s) IV Push every 4 hours PRN Nausea and/or Vomiting      Vital Signs Last 24 Hrs  T(C): 36.7 (19 Jun 2022 10:03), Max: 37.9 (18 Jun 2022 23:21)  T(F): 98.1 (19 Jun 2022 10:03), Max: 100.2 (18 Jun 2022 23:21)  HR: 99 (19 Jun 2022 10:03) (97 - 110)  BP: 170/84 (19 Jun 2022 10:03) (129/71 - 173/82)  RR: 18 (19 Jun 2022 10:28) (17 - 19)  SpO2: 93% (19 Jun 2022 10:28) (90% - 96%)  Neurological Exam:  HF: Patient is somnolent, briefly opens eyes, non cooperative.   CN: Limited, Pupils are equal and reactive. Extra ocular muscles are grossly intact. There is no gross facial droop or asymmetry. Tongue is midline.   Motor: motor examination: moves all extremities.   Sensory: unable due to sedation  DTR: 0-1/4 all 4 extremities. Babinski are withdrawal   Co-ord:  Unable to test.       Comprehensive Metabolic Panel in AM (06.19.22 @ 08:19)   Sodium, Serum: 130 mmol/L   Potassium, Serum: 4.1 mmol/L   Chloride, Serum: 100 mmol/L   Carbon Dioxide, Serum: 21 mmol/L   Anion Gap, Serum: 9 mmol/L   Blood Urea Nitrogen, Serum: 7 mg/dL   Creatinine, Serum: 0.86 mg/dL   Glucose, Serum: 127 mg/dL   Calcium, Total Serum: 8.8 mg/dL   Protein Total, Serum: 7.0 gm/dL   Albumin, Serum: 2.2 g/dL   Bilirubin Total, Serum: 0.5 mg/dL   Alkaline Phosphatase, Serum: 75 U/L   Aspartate Aminotransferase (AST/SGOT): 51 U/L   Alanine Aminotransferase (ALT/SGPT): 33 U/L   eGFR: 96:Vitamin B12,     Serum: 531 pg/mL (06.17.22 @ 17:55)     Creatine Kinase, Serum: 104 U/L (06.17.22 @ 15:07)         < from: MR Head w/wo IV Cont (06.17.22 @ 22:14) >  INTERPRETATION:  Clinical indication: Altered mental status    MRI of the brain was performed using sagittal T1 axial T1 T2 T2 FLAIR   diffusion and susceptibility weighted sequence. The patient was injected   with approximately 10 cc of gadavist IV and sagittal coronal and axial   T1-weighted sequences were performed.    This exam is compared prior head CT performed earlier the same day at   3:17PM.    Parenchymal volume loss and chronic microvascular ischemic changes are   identified.    There is no evidence acute hemorrhage mass, mass effect or abnormal   enhancement seen.    Evaluation of the diffusion weighted sequence demonstrates no abnormal   areas of restricted diffusion to suggest acute infarct.    The large vessels demonstrate normal flow voids.    Bilateral maxillary polyps versus retention cyst is seen.    Bilateral ethmoid sinus mucosal thickening is seen.    IMPRESSION: No evidence of acute hemorrhage mass mass effect or abnormal   enhancement seen.    < end of copied text >

## 2022-06-19 NOTE — PROGRESS NOTE ADULT - ASSESSMENT
67 y/o male with h/o GERD was admitted on 6/17 for weakness, urinary frequency, urge incontinence x 2 weeks. Patient reports he went to see Dr. Ferguson (PCP) on the day of admission for this problems and was told to come to the ER. Pt reports he noticed increased weakness to his lower extremities (having trouble standing up and walking, with shuffling gait) for the past 2 days. Patient has nocturia (Voids 3-4x) a night for past 5 years. Spouse notes patient with increased confusion and short term memory loss in the past week. Denies fever, chills, HA, recent falls. In the ED patient VS were temp 100.4F and received ceftriaxone.     1. Low grade fever. Dysuria. Probable UTI and/or prostatitis. Possible LLL pneumonia.   -encephalopathy  -BC x 2, urine c/s noted  -on ceftriaxone 1 gm IV qd and azithromycin 500 mg PO qd # 2  -tolerating abx well so far; no side effects noted  -remains confused  -continue abx coverage  -f/u cultures  -monitor temps  -f/u CBC  -supportive care  2. Other issues:   -care per medicine

## 2022-06-19 NOTE — CHART NOTE - NSCHARTNOTEFT_GEN_A_CORE
patient CIWA score was 10 during shift, confused, combating with nurses  s/p Ativan for symptom trigger  Started Ativan Taper  Symptom triggered ativan  EKG in the AM

## 2022-06-19 NOTE — PROGRESS NOTE ADULT - ASSESSMENT
67 y/o man with h/o GERD was admitted on 6/17 with  reported increased confusion and short term memory loss with low grade fever. Dysuria. Probable UTI and/or prostatitis. Possible LLL pneumonia. encephalopathy. ETOH withdrawal. On Ativan protocol. CT/MR head shows no acute changes. No evidence for CVA.  Suggest:  -continue abx coverage, as per medicine, ID  -thiamine suppl  -supportive care

## 2022-06-20 LAB
BASOPHILS # BLD AUTO: 0.03 K/UL — SIGNIFICANT CHANGE UP (ref 0–0.2)
BASOPHILS NFR BLD AUTO: 0.4 % — SIGNIFICANT CHANGE UP (ref 0–2)
EOSINOPHIL # BLD AUTO: 0.01 K/UL — SIGNIFICANT CHANGE UP (ref 0–0.5)
EOSINOPHIL NFR BLD AUTO: 0.1 % — SIGNIFICANT CHANGE UP (ref 0–6)
HCT VFR BLD CALC: 48.3 % — SIGNIFICANT CHANGE UP (ref 39–50)
HGB BLD-MCNC: 16.2 G/DL — SIGNIFICANT CHANGE UP (ref 13–17)
IMM GRANULOCYTES NFR BLD AUTO: 0.4 % — SIGNIFICANT CHANGE UP (ref 0–1.5)
LYMPHOCYTES # BLD AUTO: 0.52 K/UL — LOW (ref 1–3.3)
LYMPHOCYTES # BLD AUTO: 7.3 % — LOW (ref 13–44)
MAGNESIUM SERPL-MCNC: 2.8 MG/DL — HIGH (ref 1.6–2.6)
MCHC RBC-ENTMCNC: 32.7 PG — SIGNIFICANT CHANGE UP (ref 27–34)
MCHC RBC-ENTMCNC: 33.5 GM/DL — SIGNIFICANT CHANGE UP (ref 32–36)
MCV RBC AUTO: 97.4 FL — SIGNIFICANT CHANGE UP (ref 80–100)
MONOCYTES # BLD AUTO: 0.94 K/UL — HIGH (ref 0–0.9)
MONOCYTES NFR BLD AUTO: 13.3 % — SIGNIFICANT CHANGE UP (ref 2–14)
NEUTROPHILS # BLD AUTO: 5.56 K/UL — SIGNIFICANT CHANGE UP (ref 1.8–7.4)
NEUTROPHILS NFR BLD AUTO: 78.5 % — HIGH (ref 43–77)
PHOSPHATE SERPL-MCNC: 4 MG/DL — SIGNIFICANT CHANGE UP (ref 2.5–4.5)
PLATELET # BLD AUTO: 162 K/UL — SIGNIFICANT CHANGE UP (ref 150–400)
RBC # BLD: 4.96 M/UL — SIGNIFICANT CHANGE UP (ref 4.2–5.8)
RBC # FLD: 13.8 % — SIGNIFICANT CHANGE UP (ref 10.3–14.5)
WBC # BLD: 7.09 K/UL — SIGNIFICANT CHANGE UP (ref 3.8–10.5)
WBC # FLD AUTO: 7.09 K/UL — SIGNIFICANT CHANGE UP (ref 3.8–10.5)

## 2022-06-20 PROCEDURE — 71275 CT ANGIOGRAPHY CHEST: CPT | Mod: 26

## 2022-06-20 PROCEDURE — 71045 X-RAY EXAM CHEST 1 VIEW: CPT | Mod: 26

## 2022-06-20 PROCEDURE — 99233 SBSQ HOSP IP/OBS HIGH 50: CPT | Mod: GC

## 2022-06-20 RX ORDER — THIAMINE MONONITRATE (VIT B1) 100 MG
250 TABLET ORAL DAILY
Refills: 0 | Status: DISCONTINUED | OUTPATIENT
Start: 2022-06-20 | End: 2022-06-20

## 2022-06-20 RX ORDER — THIAMINE MONONITRATE (VIT B1) 100 MG
250 TABLET ORAL DAILY
Refills: 0 | Status: DISCONTINUED | OUTPATIENT
Start: 2022-06-20 | End: 2022-06-24

## 2022-06-20 RX ORDER — SODIUM CHLORIDE 9 MG/ML
1000 INJECTION INTRAMUSCULAR; INTRAVENOUS; SUBCUTANEOUS
Refills: 0 | Status: DISCONTINUED | OUTPATIENT
Start: 2022-06-20 | End: 2022-06-24

## 2022-06-20 RX ADMIN — Medication 1.5 MILLIGRAM(S): at 06:00

## 2022-06-20 RX ADMIN — Medication 255 MILLIGRAM(S): at 05:36

## 2022-06-20 RX ADMIN — Medication 255 MILLIGRAM(S): at 14:41

## 2022-06-20 RX ADMIN — AZITHROMYCIN 500 MILLIGRAM(S): 500 TABLET, FILM COATED ORAL at 10:26

## 2022-06-20 RX ADMIN — SODIUM CHLORIDE 63 MILLILITER(S): 9 INJECTION INTRAMUSCULAR; INTRAVENOUS; SUBCUTANEOUS at 11:24

## 2022-06-20 RX ADMIN — Medication 1.5 MILLIGRAM(S): at 02:05

## 2022-06-20 RX ADMIN — PANTOPRAZOLE SODIUM 40 MILLIGRAM(S): 20 TABLET, DELAYED RELEASE ORAL at 10:28

## 2022-06-20 RX ADMIN — Medication 1 MILLIGRAM(S): at 10:28

## 2022-06-20 RX ADMIN — CEFTRIAXONE 100 MILLIGRAM(S): 500 INJECTION, POWDER, FOR SOLUTION INTRAMUSCULAR; INTRAVENOUS at 10:27

## 2022-06-20 NOTE — PHYSICAL THERAPY INITIAL EVALUATION ADULT - LEVEL OF INDEPENDENCE: STAND/SIT, REHAB EVAL
seated in WC after c/o sudden need to void,pulled diaper aside and was then incontinent of urine to floor ; subsequently escorted to BR in WC and toileted standing over toilet with grab bars/contact guard/minimum assist (75% patients effort)

## 2022-06-20 NOTE — PHYSICAL THERAPY INITIAL EVALUATION ADULT - GENERAL OBSERVATIONS, REHAB EVAL
supine recumbent in bed,lethargic/somnolent but arousable,son Juan Alberto at the bedside, has o2 cannula but not in nares, wearing adult diaper, sluggish /limp quality to movement , dozes off readily in middle of sentence

## 2022-06-20 NOTE — PHYSICAL THERAPY INITIAL EVALUATION ADULT - LIVES WITH, PROFILE
lives in [place] with wife/spouse lives in [place] with wife,son Juan Alberto lives nearby in [place] and is concerned/supportive/spouse

## 2022-06-20 NOTE — PHYSICAL THERAPY INITIAL EVALUATION ADULT - PATIENT/FAMILY/SIGNIFICANT OTHER GOALS STATEMENT, PT EVAL
son Juan Alberto is eager for pt to get out of bed and attempt to walk ,wants his father to get better; pt is pleasant,smiling when awakened and cooperative with exam ,but has difficulty staying alert/attentive

## 2022-06-20 NOTE — PHYSICAL THERAPY INITIAL EVALUATION ADULT - IMPAIRMENTS CONTRIBUTING TO GAIT DEVIATIONS, PT EVAL
impaired balance/cognition/impaired coordination/impaired motor control/abnormal muscle tone/impaired postural control

## 2022-06-20 NOTE — PHYSICAL THERAPY INITIAL EVALUATION ADULT - PERTINENT HX OF CURRENT PROBLEM, REHAB EVAL
pt c/o 2 week h/o generalized/BLE weakness ,difficulty standing/walking ,urinary frequency and urge incontinence, pt with >10 year h/o ETOH misuse ,drinks 2L scotch q3 days

## 2022-06-20 NOTE — PROGRESS NOTE ADULT - SUBJECTIVE AND OBJECTIVE BOX
HPI:  Patient is a 65 yo male current day smoker (5 cigarettes daily) and Chronic ETOH missuse (10+ years) with a PMH of GERD who presents with weakness, frequency, urge incontinence x 2 weeks. Patient reports he went to see Dr. Das(PCP) today for this problems and was told to come to the ER. Pt reports he noticed increased weakness to his lower extremities (having trouble standing up and walking, with shuffling gait) for the past 2 days. Patient has a chronic hx of nocturia (Voids 3-4x) a night for past 5 years and has not been evaluated for this medical issue. Spouse notes patient with increased confusion and short term memory loss in the past week. Denies hx of UTIs or ETOH withdrawals. Denies fevers, chills, HA, recent falls, urinary hesitancy. Last etoh drink was yesterday night. In the ED patient VS were temp 100.4, /71, RR 18, Pulse ox 97. CT head was unremarkable for intracranial bleeding, POCT UA was positive for blood and bacteria. Pt was given 2L of fluids and ceftriaxone.     6/18/22: No events since ED. Pt reports no fever but still pleasantly confused. No ha, dizziness, cp, palpitations, n/v  6/19/22: Pt seen at evaluated at bedside. Place on CIWA ativan taper overnight for increased agitation. Pt opens eyes to name being called but sleepy. Pt's wife, Beatrice at bedside.   6/20/22: Overnight, pt was still showing withdrawal sxs w/ CIWA scores 5-6, showing low grade T/tachycardias. Pt lethargic today s/p ativan but rousable; pt became hypoxic earlier and required O2 NC. Has coarse breathing and snoring but in NAD, O2 stats table.    Pt denies ha, cp, palpitations, n/v. ROS as stated above.    Vital Signs Last 24 Hrs  T(C): 36.9 (20 Jun 2022 15:09), Max: 37.7 (20 Jun 2022 08:32)  T(F): 98.5 (20 Jun 2022 15:09), Max: 99.8 (20 Jun 2022 08:32)  HR: 100 (20 Jun 2022 15:09) (100 - 111)  BP: 116/58 (20 Jun 2022 15:09) (116/58 - 168/87)  BP(mean): --  RR: 18 (20 Jun 2022 15:09) (18 - 19)  SpO2: 94% (20 Jun 2022 15:09) (91% - 100%)Vital Signs Last 24 Hrs      GENERAL: NAD, lying in bed, lethargic  HEAD:  Atraumatic, Normocephalic  EYES: EOMI, PERRLA, conjunctiva and sclera clear  ENT: Moist mucous membranes  NECK: Supple, No JVD  Pulm: Some mild b/l crackles No wheezing, or rubs. Unlabored respirations  Cardio: Regular rate and rhythm; No murmurs, rubs, or gallops  Gastro: Bowel sounds present; Soft, Nontender, Nondistended. No hepatomegally  EXTREMITIES:  2+ Peripheral Pulses, brisk capillary refill. No clubbing, cyanosis, or edema  NERVOUS SYSTEM:  Alert & Orientedx3 speech clear. No deficits   MSK: FROM all 4 extremities, 4/5 strength. Unstable on feet  SKIN: No rashes or lesions    MEDICATIONS:  MEDICATIONS  (STANDING):  azithromycin   Tablet 500 milliGRAM(s) Oral daily  cefTRIAXone   IVPB 1000 milliGRAM(s) IV Intermittent every 24 hours  enoxaparin Injectable 40 milliGRAM(s) SubCutaneous every 24 hours  folic acid 1 milliGRAM(s) Oral daily  influenza  Vaccine (HIGH DOSE) 0.7 milliLiter(s) IntraMuscular once  LORazepam   Injectable   IV Push   LORazepam   Injectable 1.5 milliGRAM(s) IV Push every 4 hours  pantoprazole    Tablet 40 milliGRAM(s) Oral before breakfast  sodium chloride 0.9%. 1000 milliLiter(s) (63 mL/Hr) IV Continuous <Continuous>  thiamine Injectable 250 milliGRAM(s) IntraMuscular daily    MEDICATIONS  (PRN):  LORazepam   Injectable 2 milliGRAM(s) IV Push every 1 hour PRN Symptom-triggered: each CIWA -Ar score 8 or GREATER  LORazepam   Injectable 2 milliGRAM(s) IV Push every 2 hours PRN Symptom-triggered: 2 point increase in CIWA -Ar score and a total score of 7 or LESS  ondansetron Injectable 4 milliGRAM(s) IV Push every 4 hours PRN Nausea and/or Vomiting      LABS: All Labs Reviewed:                        16.2   7.09  )-----------( 162      ( 20 Jun 2022 09:04 )             48.3     06-20    133<L>  |  102  |  8   ----------------------------<  129<H>  4.5   |  23  |  0.78    Ca    9.4      20 Jun 2022 09:04  Phos  4.0     06-20  Mg     2.8     06-20    TPro  7.5  /  Alb  2.2<L>  /  TBili  0.5  /  DBili  x   /  AST  96<H>  /  ALT  63  /  AlkPhos  86  06-20    Blood Culture: 06-17 @ 15:07  Organism --  Gram Stain Blood -- Gram Stain --  Specimen Source .Blood None  Culture-Blood --      I&O's Summary    CAPILLARY BLOOD GLUCOSE      EKG/RADIOLOGY  < from: Xray Chest 1 View- PORTABLE-Urgent (06.17.22 @ 15:43) >    IMPRESSION:   LEFT mid zone peripheral focal airspace disease. Follow-up   chest radiograph recommended.    --- End of Report ---    < end of copied text >    < from: CT Head No Cont (06.17.22 @ 15:26) >    IMPRESSION:  Minimal chronic microvascular changes without evidence ofan   acute transcortical infarction or hemorrhage.    < end of copied text >

## 2022-06-20 NOTE — PHYSICAL THERAPY INITIAL EVALUATION ADULT - IMPAIRMENTS FOUND, PT EVAL
arousal, attention, and cognition/gait, locomotion, and balance/gross motor/poor safety awareness/tone

## 2022-06-20 NOTE — PROGRESS NOTE ADULT - ASSESSMENT
Patient is a 67 yo male current day smoker (5 cigarettes daily) and Chronic ETOH missuse (10+ years) with a PMH of GERD who presents with weakness, frequency, urge incontinence x 2 weeks.     #Coarse breathing, snoring 2/2 SOLOMON, however will r/o aspiration PNA  - Pt currently somnulent but rousable, coarse breathing, snoring but NAD, stable on O2 NC.  - Ordered CXR, will f/u results  - Ordered bipap  - C/w current abx    #Acute Alcohol Withdrawal  #Confusion 2/2 Wernicke's  #Hx of ETOH Abuse  - After admission, pt was found to be showing sxs of withdrawal, high CIWA scores, most recently 5-6  - Pt w/long hx of ETOH abuse, now confused,  ETOH level less than 10 on admission, CIWAs 0-2  - Continue ativan taper, hold doses if lethargic  - C/w thiamine 500mg IV TID and folate, Now switched to Thiamine 250mg IV Qd x 5 days  - Neurology consult appreciated; cont abx, cont thiamine supplementation    #Weakness 2/2 Likely Acute UTI, Subacute Pneumonia  #Urge incontinence  - CT Head positive for microvascular changes  - SIRS 1/4 temp 100.4 F Mod QSofa 1/3 AMS  - POCT UA- +ve for Protein, +ve bacteria, +ve blood  - S/P Ceftriaxone and 2L bolus in Ed  - S/p Zosyn IV; ID recs: ceftriaxone + azithromycin (day 2)  - Blood/Urine Cx NGTD  - Neurology on Board- Less likely NPH  - MRI Head negative for acute pathology  - MRI L-spine negative for acute fractures or dislocations, 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 Consider cystoscopy if symptoms persists  - C/w pt recs  - C/w bladder sans  - Low threshold to order CT abdomen/pelvis if spikes another temp     #GERD  -C/w Omeprazole PO daily     #Code status  -Full Code    #DVT pppx  -Lovenox 40U Daily     Case discussed with Dr. Cui

## 2022-06-20 NOTE — PHYSICAL THERAPY INITIAL EVALUATION ADULT - DISCHARGE DISPOSITION, PT EVAL
TBD based on progress with PT ,improved alertness/cognition, attentiveness ,reliability etc/home w/ assist/home w/ home PT/rehabilitation facility

## 2022-06-20 NOTE — PHYSICAL THERAPY INITIAL EVALUATION ADULT - PATIENT PROFILE REVIEW, REHAB EVAL
chart reviewed,was unable to be seen Sun 6/19 due to lethargy after Ativan administered for +withdrawal symptoms chart reviewed, was unable to be seen Sun 6/19 due to lethargy after Ativan administered for +withdrawal symptoms, IE completed 6/21/22 on [facility]/yes

## 2022-06-20 NOTE — PHYSICAL THERAPY INITIAL EVALUATION ADULT - DIAGNOSIS, PT EVAL
fever to 100.4F, +UA, (blood,protein,bacteria) r/o UTI, r/o prostatitis, ETOH misuse/withdrawal on CIWA protocol , generalized weakness/gait impairment , memory impairment,r/o Wernickes encephalopathy

## 2022-06-20 NOTE — PHYSICAL THERAPY INITIAL EVALUATION ADULT - PHYSICAL ASSIST/NONPHYSICAL ASSIST: GAIT, REHAB EVAL
chair follow for safety/verbal cues/nonverbal cues (demo/gestures)/1 person + 1 person to manage equipment

## 2022-06-20 NOTE — PROGRESS NOTE ADULT - SUBJECTIVE AND OBJECTIVE BOX
Date of service: 22 @ 11:54    Lying in bed in NAD  Alert and confused  Dose not seem in pain  Has low grade fever    ROS: limited    MEDICATIONS  (STANDING):  azithromycin   Tablet 500 milliGRAM(s) Oral daily  cefTRIAXone   IVPB 1000 milliGRAM(s) IV Intermittent every 24 hours  enoxaparin Injectable 40 milliGRAM(s) SubCutaneous every 24 hours  folic acid 1 milliGRAM(s) Oral daily  influenza  Vaccine (HIGH DOSE) 0.7 milliLiter(s) IntraMuscular once  LORazepam   Injectable   IV Push   LORazepam   Injectable 1.5 milliGRAM(s) IV Push every 4 hours  pantoprazole    Tablet 40 milliGRAM(s) Oral before breakfast  sodium chloride 0.9%. 1000 milliLiter(s) (63 mL/Hr) IV Continuous <Continuous>  thiamine IVPB 500 milliGRAM(s) IV Intermittent every 8 hours    Vital Signs Last 24 Hrs  T(C): 37.7 (2022 08:32), Max: 37.7 (2022 15:00)  T(F): 99.8 (2022 08:32), Max: 99.9 (2022 15:00)  HR: 111 (2022 08:34) (97 - 111)  BP: 131/76 (2022 08:34) (130/78 - 168/87)  BP(mean): --  RR: 19 (2022 08:34) (18 - 19)  SpO2: 94% (2022 08:34) (91% - 100%)     Physical exam:    Constitutional:  No acute distress  HEENT: NC/AT, EOMI, PERRLA, conjunctivae clear; ears and nose atraumatic  Neck: supple; thyroid not palpable  Back: no tenderness  Respiratory: respiratory effort normal; crackles at left base  Cardiovascular: S1S2 regular, no murmurs  Abdomen: soft, not tender, not distended, positive BS  Genitourinary: no suprapubic tenderness  Lymphatic: no LN palpable  Musculoskeletal: no muscle tenderness, no joint swelling or tenderness  Extremities: no pedal edema  Neurological/ Psychiatric: Alert, moving all extremities  Skin: no rashes; no palpable lesions    Labs: reviewed                        14.5   5.41  )-----------( 145      ( 2022 08:19 )             42.4     06-19    130<L>  |  100  |  7   ----------------------------<  127<H>  4.1   |  21<L>  |  0.86    Ca    8.8      2022 08:19  Phos  3.1     06-19  Mg     2.5     06-19    TPro  7.0  /  Alb  2.2<L>  /  TBili  0.5  /  DBili  0.2  /  AST  51<H>  /  ALT  33  /  AlkPhos  75  06-19                        15.7   8.48  )-----------( 157      ( 2022 15:07 )             46.8     06-18    134<L>  |  104  |  7   ----------------------------<  113<H>  3.9   |  22  |  0.92    Ca    8.7      2022 07:48  Phos  2.8     06-18  Mg     2.4     06-18    TPro  6.3  /  Alb  2.1<L>  /  TBili  0.5  /  DBili  x   /  AST  26  /  ALT  26  /  AlkPhos  68  06-18     LIVER FUNCTIONS - ( 2022 07:48 )  Alb: 2.1 g/dL / Pro: 6.3 gm/dL / ALK PHOS: 68 U/L / ALT: 26 U/L / AST: 26 U/L / GGT: x           Urinalysis Basic - ( 2022 15:07 )    Color: Yellow / Appearance: Clear / S.015 / pH: x  Gluc: x / Ketone: Negative  / Bili: Negative / Urobili: 1   Blood: x / Protein: 100 / Nitrite: Negative   Leuk Esterase: Trace / RBC: 6-10 /HPF / WBC 0-2   Sq Epi: x / Non Sq Epi: Occasional / Bacteria: Moderate    ( @ 15:07)  NotDetec      Culture - Urine (collected 2022 15:07)  Source: Clean Catch None  Final Report (2022 19:18):    <10,000 CFU/mL Normal Urogenital Jaqueline    Culture - Blood (collected 2022 15:07)  Source: .Blood None  Preliminary Report (2022 22:01):    No growth to date.    Culture - Blood (collected 2022 15:07)  Source: .Blood None  Preliminary Report (2022 22:01):    No growth to date.    Radiology: all available radiological tests reviewed    < from: Xray Chest 1 View- PORTABLE-Urgent (22 @ 15:43) >  IMPRESSION:   LEFT mid zone peripheral focal airspace disease.  < end of copied text >      Advanced directives addressed: full resuscitation

## 2022-06-20 NOTE — PROGRESS NOTE ADULT - ASSESSMENT
65 y/o male with h/o GERD was admitted on 6/17 for weakness, urinary frequency, urge incontinence x 2 weeks. Patient reports he went to see Dr. Ferguson (PCP) on the day of admission for this problems and was told to come to the ER. Pt reports he noticed increased weakness to his lower extremities (having trouble standing up and walking, with shuffling gait) for the past 2 days. Patient has nocturia (Voids 3-4x) a night for past 5 years. Spouse notes patient with increased confusion and short term memory loss in the past week. Denies fever, chills, HA, recent falls. In the ED patient VS were temp 100.4F and received ceftriaxone.     1. Low grade fever. Probable UTI and/or prostatitis. Possible LLL pneumonia.   -encephalopathy  -BC x 2, urine c/s noted  -on ceftriaxone 1 gm IV qd and azithromycin 500 mg PO qd # 3  -tolerating abx well so far; no side effects noted  -remains confused  -continue abx coverage  -f/u cultures  -monitor temps  -f/u CBC  -supportive care  2. Other issues:   -care per medicine

## 2022-06-20 NOTE — PHYSICAL THERAPY INITIAL EVALUATION ADULT - SKIN MOISTURE
wearing adult diaper , incontinent of urine during ambulation this date ,had urge to void and pulled diaper aside ,wet floor

## 2022-06-20 NOTE — PHYSICAL THERAPY INITIAL EVALUATION ADULT - ACTIVE RANGE OF MOTION EXAMINATION, REHAB EVAL
AAROM/whitney. upper extremity Active ROM was WNL (within normal limits)/bilateral  lower extremity Active ROM was WFL (within functional limits)

## 2022-06-20 NOTE — PHYSICAL THERAPY INITIAL EVALUATION ADULT - GAIT PATTERN USED, PT EVAL
sluggish,lumbering gait quality, unsteady,listing mildly to L and occasionally posterior/2-point gait

## 2022-06-21 LAB
-  BLOOD PCR PANEL: SIGNIFICANT CHANGE UP
ALBUMIN SERPL ELPH-MCNC: 2 G/DL — LOW (ref 3.3–5)
ALP SERPL-CCNC: 82 U/L — SIGNIFICANT CHANGE UP (ref 40–120)
ALT FLD-CCNC: 58 U/L — SIGNIFICANT CHANGE UP (ref 12–78)
ANION GAP SERPL CALC-SCNC: 8 MMOL/L — SIGNIFICANT CHANGE UP (ref 5–17)
AST SERPL-CCNC: 63 U/L — HIGH (ref 15–37)
BILIRUB SERPL-MCNC: 0.4 MG/DL — SIGNIFICANT CHANGE UP (ref 0.2–1.2)
BUN SERPL-MCNC: 12 MG/DL — SIGNIFICANT CHANGE UP (ref 7–23)
CALCIUM SERPL-MCNC: 9.6 MG/DL — SIGNIFICANT CHANGE UP (ref 8.5–10.1)
CHLORIDE SERPL-SCNC: 103 MMOL/L — SIGNIFICANT CHANGE UP (ref 96–108)
CO2 SERPL-SCNC: 27 MMOL/L — SIGNIFICANT CHANGE UP (ref 22–31)
CREAT SERPL-MCNC: 0.73 MG/DL — SIGNIFICANT CHANGE UP (ref 0.5–1.3)
EGFR: 100 ML/MIN/1.73M2 — SIGNIFICANT CHANGE UP
GLUCOSE SERPL-MCNC: 102 MG/DL — HIGH (ref 70–99)
GRAM STN FLD: SIGNIFICANT CHANGE UP
HCT VFR BLD CALC: 49.3 % — SIGNIFICANT CHANGE UP (ref 39–50)
HGB BLD-MCNC: 16.3 G/DL — SIGNIFICANT CHANGE UP (ref 13–17)
MCHC RBC-ENTMCNC: 32.5 PG — SIGNIFICANT CHANGE UP (ref 27–34)
MCHC RBC-ENTMCNC: 33.1 GM/DL — SIGNIFICANT CHANGE UP (ref 32–36)
MCV RBC AUTO: 98.4 FL — SIGNIFICANT CHANGE UP (ref 80–100)
METHOD TYPE: SIGNIFICANT CHANGE UP
PLATELET # BLD AUTO: 209 K/UL — SIGNIFICANT CHANGE UP (ref 150–400)
POTASSIUM SERPL-MCNC: 4.2 MMOL/L — SIGNIFICANT CHANGE UP (ref 3.5–5.3)
POTASSIUM SERPL-SCNC: 4.2 MMOL/L — SIGNIFICANT CHANGE UP (ref 3.5–5.3)
PROT SERPL-MCNC: 7.1 GM/DL — SIGNIFICANT CHANGE UP (ref 6–8.3)
RBC # BLD: 5.01 M/UL — SIGNIFICANT CHANGE UP (ref 4.2–5.8)
RBC # FLD: 13.9 % — SIGNIFICANT CHANGE UP (ref 10.3–14.5)
SODIUM SERPL-SCNC: 138 MMOL/L — SIGNIFICANT CHANGE UP (ref 135–145)
WBC # BLD: 5.65 K/UL — SIGNIFICANT CHANGE UP (ref 3.8–10.5)
WBC # FLD AUTO: 5.65 K/UL — SIGNIFICANT CHANGE UP (ref 3.8–10.5)

## 2022-06-21 PROCEDURE — 99233 SBSQ HOSP IP/OBS HIGH 50: CPT | Mod: GC

## 2022-06-21 RX ADMIN — SODIUM CHLORIDE 63 MILLILITER(S): 9 INJECTION INTRAMUSCULAR; INTRAVENOUS; SUBCUTANEOUS at 09:42

## 2022-06-21 RX ADMIN — AZITHROMYCIN 500 MILLIGRAM(S): 500 TABLET, FILM COATED ORAL at 09:41

## 2022-06-21 RX ADMIN — Medication 1 MILLIGRAM(S): at 12:59

## 2022-06-21 RX ADMIN — CEFTRIAXONE 100 MILLIGRAM(S): 500 INJECTION, POWDER, FOR SOLUTION INTRAMUSCULAR; INTRAVENOUS at 11:44

## 2022-06-21 RX ADMIN — Medication 1 MILLIGRAM(S): at 09:41

## 2022-06-21 RX ADMIN — Medication 2 MILLIGRAM(S): at 00:01

## 2022-06-21 RX ADMIN — PANTOPRAZOLE SODIUM 40 MILLIGRAM(S): 20 TABLET, DELAYED RELEASE ORAL at 09:42

## 2022-06-21 RX ADMIN — Medication 102.5 MILLIGRAM(S): at 12:58

## 2022-06-21 NOTE — PROGRESS NOTE ADULT - ASSESSMENT
Patient is a 67 yo male current day smoker (5 cigarettes daily) and Chronic ETOH missuse (10+ years) with a PMH of GERD who presents with weakness, frequency, urge incontinence x 2 weeks.     #Coarse breathing, snoring 2/2 SOLOMON, however will r/o aspiration PNA  - Pt currently somnulent but rousable, coarse breathing, snoring but NAD, stable on O2 NC.  - Ordered CXR, will f/u results  - Ordered bipap  - C/w current abx    #Acute Alcohol Withdrawal  #Confusion 2/2 Wernicke's  #Hx of ETOH Abuse  - After admission, pt was found to be showing sxs of withdrawal, high CIWA scores, most recently 5-6  - Pt w/long hx of ETOH abuse, now confused,  ETOH level less than 10 on admission, CIWAs 0-2  - Continue ativan taper, hold doses if lethargic  - C/w thiamine 500mg IV TID and folate, Now switched to Thiamine 250mg IV Qd x 5 days  - Neurology consult appreciated; cont abx, cont thiamine supplementation    #Weakness 2/2 Likely Acute UTI, Subacute Pneumonia  #Urge incontinence  - CT Head positive for microvascular changes  - SIRS 1/4 temp 100.4 F Mod QSofa 1/3 AMS  - POCT UA- +ve for Protein, +ve bacteria, +ve blood  - S/P Ceftriaxone and 2L bolus in Ed  - S/p Zosyn IV; ID recs: ceftriaxone + azithromycin (day 2)  - Blood/Urine Cx NGTD  - Neurology on Board- Less likely NPH  - MRI Head negative for acute pathology  - MRI L-spine negative for acute fractures or dislocations, 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 Consider cystoscopy if symptoms persists  - C/w pt recs  - C/w bladder sans  - Low threshold to order CT abdomen/pelvis if spikes another temp     #GERD  -C/w Omeprazole PO daily     #Code status  -Full Code    #DVT pppx  -Lovenox 40U Daily     Case discussed with Dr. Cui Patient is a 67 yo male current day smoker (5 cigarettes daily) and Chronic ETOH missuse (10+ years) with a PMH of GERD who presents with weakness, frequency, urge incontinence x 2 weeks.     #Coarse breathing, snoring 2/2 SOLOMON, however will r/o aspiration PNA  - Pt currently somnulent but rousable, coarse breathing, snoring but NAD, stable on O2 NC.  - CXR, CTA chest reviewed, no PE, JENNIFER PNA  - CPAP at night  - C/w CTX and Azithromycin    #Acute Alcohol Withdrawal  #Confusion 2/2 Wernicke's  #Hx of ETOH Abuse  - After admission, pt was found to be showing sxs of withdrawal, high CIWA scores, most recently 5-6  - Pt w/long hx of ETOH abuse, now confused,  ETOH level less than 10 on admission, CIWAs 0-2  - D/c ativan taper, c/w symptom triggered, CIWA scoring 3  - C/w thiamine 500mg IV TID and folate, Now switched to Thiamine 250mg IV Qd x 5 days  - Neurology consult appreciated; cont abx, cont thiamine supplementation    #Weakness 2/2 Likely Acute UTI, Subacute Pneumonia  #Urge incontinence  - CT Head positive for microvascular changes  - SIRS 1/4 temp 100.4 F Mod QSofa 1/3 AMS  - POCT UA- +ve for Protein, +ve bacteria, +ve blood  - S/P Ceftriaxone and 2L bolus in Ed  - S/p Zosyn IV; ID recs: ceftriaxone + azithromycin (day 2)  - Blood/Urine Cx NGTD  - Neurology on Board- Less likely NPH  - MRI Head negative for acute pathology  - MRI L-spine negative for acute fractures or dislocations, minimal grade 1 spondylolytic spondylolisthesis of L5 on S1, epidural lipomatosis affecting the lower lumbosacral canal, mild multilevel lumbar spondylosis, worst at the L4-L5 and L5-S1 levels  - ID consult appreciated, cont CTX, azithromycin Day #3  - Urology consult appreciated; cont abx, cont flomax, f/u as OP. Consider cystoscopy if symptoms persists  - C/w pt recs  - C/w bladder sans  - Low threshold to order CT abdomen/pelvis if spikes another temp     #GERD  -C/w Omeprazole PO daily     #Code status  -Full Code    #DVT pppx  -Lovenox 40U Daily     Case discussed with Dr. Shepherd

## 2022-06-21 NOTE — PROGRESS NOTE ADULT - SUBJECTIVE AND OBJECTIVE BOX
HPI:  Patient is a 67 yo male current day smoker (5 cigarettes daily) and Chronic ETOH missuse (10+ years) with a PMH of GERD who presents with weakness, frequency, urge incontinence x 2 weeks. Patient reports he went to see Dr. Das(PCP) today for this problems and was told to come to the ER. Pt reports he noticed increased weakness to his lower extremities (having trouble standing up and walking, with shuffling gait) for the past 2 days. Patient has a chronic hx of nocturia (Voids 3-4x) a night for past 5 years and has not been evaluated for this medical issue. Spouse notes patient with increased confusion and short term memory loss in the past week. Denies hx of UTIs or ETOH withdrawals. Denies fevers, chills, HA, recent falls, urinary hesitancy. Last etoh drink was yesterday night. In the ED patient VS were temp 100.4, /71, RR 18, Pulse ox 97. CT head was unremarkable for intracranial bleeding, POCT UA was positive for blood and bacteria. Pt was given 2L of fluids and ceftriaxone.     6/18/22: No events since ED. Pt reports no fever but still pleasantly confused. No ha, dizziness, cp, palpitations, n/v  6/19/22: Pt seen at evaluated at bedside. Place on CIWA ativan taper overnight for increased agitation. Pt opens eyes to name being called but sleepy. Pt's wife, Beatrice at bedside.   6/20/22: Overnight, pt was still showing withdrawal sxs w/ CIWA scores 5-6, showing low grade T/tachycardias. Pt lethargic today s/p ativan but rousable; pt became hypoxic earlier and required O2 NC. Has coarse breathing and snoring but in NAD, O2 stats table.  6/21/22: Pt seen and examined at bedside.    Pt denies ha, cp, palpitations, n/v. ROS as stated above.    Vital Signs Last 24 Hrs  T(C): 36.5 (21 Jun 2022 12:00), Max: 36.9 (20 Jun 2022 15:09)  T(F): 97.7 (21 Jun 2022 12:00), Max: 98.5 (20 Jun 2022 15:09)  HR: 85 (21 Jun 2022 12:00) (79 - 100)  BP: 101/76 (21 Jun 2022 12:00) (101/76 - 134/75)  BP(mean): --  RR: 18 (21 Jun 2022 12:00) (17 - 18)  SpO2: 94% (21 Jun 2022 12:00) (93% - 96%)    GENERAL: NAD, lying in bed, lethargic  HEAD:  Atraumatic, Normocephalic  EYES: EOMI, PERRLA, conjunctiva and sclera clear  ENT: Moist mucous membranes  NECK: Supple, No JVD  Pulm: Some mild b/l crackles No wheezing, or rubs. Unlabored respirations  Cardio: Regular rate and rhythm; No murmurs, rubs, or gallops  Gastro: Bowel sounds present; Soft, Nontender, Nondistended. No hepatomegally  EXTREMITIES:  2+ Peripheral Pulses, brisk capillary refill. No clubbing, cyanosis, or edema  NERVOUS SYSTEM:  Alert & Orientedx3 speech clear. No deficits   MSK: FROM all 4 extremities, 4/5 strength. Unstable on feet  SKIN: No rashes or lesions    MEDICATIONS:  MEDICATIONS  (STANDING):  azithromycin   Tablet 500 milliGRAM(s) Oral daily  cefTRIAXone   IVPB 1000 milliGRAM(s) IV Intermittent every 24 hours  enoxaparin Injectable 40 milliGRAM(s) SubCutaneous every 24 hours  folic acid 1 milliGRAM(s) Oral daily  influenza  Vaccine (HIGH DOSE) 0.7 milliLiter(s) IntraMuscular once  LORazepam   Injectable   IV Push   LORazepam   Injectable 1.5 milliGRAM(s) IV Push every 4 hours  pantoprazole    Tablet 40 milliGRAM(s) Oral before breakfast  sodium chloride 0.9%. 1000 milliLiter(s) (63 mL/Hr) IV Continuous <Continuous>  thiamine Injectable 250 milliGRAM(s) IntraMuscular daily    MEDICATIONS  (PRN):  LORazepam   Injectable 2 milliGRAM(s) IV Push every 1 hour PRN Symptom-triggered: each CIWA -Ar score 8 or GREATER  LORazepam   Injectable 2 milliGRAM(s) IV Push every 2 hours PRN Symptom-triggered: 2 point increase in CIWA -Ar score and a total score of 7 or LESS  ondansetron Injectable 4 milliGRAM(s) IV Push every 4 hours PRN Nausea and/or Vomiting      LABS: All Labs Reviewed:                        16.2   7.09  )-----------( 162      ( 20 Jun 2022 09:04 )             48.3     06-20    133<L>  |  102  |  8   ----------------------------<  129<H>  4.5   |  23  |  0.78    Ca    9.4      20 Jun 2022 09:04  Phos  4.0     06-20  Mg     2.8     06-20    TPro  7.5  /  Alb  2.2<L>  /  TBili  0.5  /  DBili  x   /  AST  96<H>  /  ALT  63  /  AlkPhos  86  06-20    Blood Culture: 06-17 @ 15:07  Organism --  Gram Stain Blood -- Gram Stain --  Specimen Source .Blood None  Culture-Blood --      I&O's Summary    CAPILLARY BLOOD GLUCOSE      EKG/RADIOLOGY  < from: Xray Chest 1 View- PORTABLE-Urgent (06.17.22 @ 15:43) >    IMPRESSION:   LEFT mid zone peripheral focal airspace disease. Follow-up   chest radiograph recommended.    --- End of Report ---    < end of copied text >    < from: CT Head No Cont (06.17.22 @ 15:26) >    IMPRESSION:  Minimal chronic microvascular changes without evidence ofan   acute transcortical infarction or hemorrhage.    < end of copied text >   HPI:  Patient is a 65 yo male current day smoker (5 cigarettes daily) and Chronic ETOH missuse (10+ years) with a PMH of GERD who presents with weakness, frequency, urge incontinence x 2 weeks. Patient reports he went to see Dr. Das(PCP) today for this problems and was told to come to the ER. Pt reports he noticed increased weakness to his lower extremities (having trouble standing up and walking, with shuffling gait) for the past 2 days. Patient has a chronic hx of nocturia (Voids 3-4x) a night for past 5 years and has not been evaluated for this medical issue. Spouse notes patient with increased confusion and short term memory loss in the past week. Denies hx of UTIs or ETOH withdrawals. Denies fevers, chills, HA, recent falls, urinary hesitancy. Last etoh drink was yesterday night. In the ED patient VS were temp 100.4, /71, RR 18, Pulse ox 97. CT head was unremarkable for intracranial bleeding, POCT UA was positive for blood and bacteria. Pt was given 2L of fluids and ceftriaxone.     6/18/22: No events since ED. Pt reports no fever but still pleasantly confused. No ha, dizziness, cp, palpitations, n/v  6/19/22: Pt seen at evaluated at bedside. Place on CIWA ativan taper overnight for increased agitation. Pt opens eyes to name being called but sleepy. Pt's wife, Beatrice at bedside.   6/20/22: Overnight, pt was still showing withdrawal sxs w/ CIWA scores 5-6, showing low grade T/tachycardias. Pt lethargic today s/p ativan but rousable; pt became hypoxic earlier and required O2 NC. Has coarse breathing and snoring but in NAD, O2 stats table.  6/21/22: Pt seen and examined at bedside. Pt much more alert than yesterday. Pt is oriented to person and place. Pt denies any acute complaints. No overnight events. CTA chest reviewed and discussed with pt; showing JENNIFER PNA. Discussed starting CPAP at night and pt is agreeable.    Pt denies ha, cp, palpitations, n/v. ROS as stated above.    Vital Signs Last 24 Hrs  T(C): 36.5 (21 Jun 2022 12:00), Max: 36.9 (20 Jun 2022 15:09)  T(F): 97.7 (21 Jun 2022 12:00), Max: 98.5 (20 Jun 2022 15:09)  HR: 85 (21 Jun 2022 12:00) (79 - 100)  BP: 101/76 (21 Jun 2022 12:00) (101/76 - 134/75)  BP(mean): --  RR: 18 (21 Jun 2022 12:00) (17 - 18)  SpO2: 94% (21 Jun 2022 12:00) (93% - 96%)    GENERAL: NAD, lying in bed, lethargic  HEAD:  Atraumatic, Normocephalic  EYES: EOMI, PERRLA, conjunctiva and sclera clear  ENT: Moist mucous membranes  NECK: Supple, No JVD  Pulm: Some mild b/l crackles No wheezing, or rubs. Unlabored respirations  Cardio: Regular rate and rhythm; No murmurs, rubs, or gallops  Gastro: Bowel sounds present; Soft, Nontender, Nondistended. No hepatomegally  EXTREMITIES:  2+ Peripheral Pulses, brisk capillary refill. No clubbing, cyanosis, or edema  NERVOUS SYSTEM:  Alert & Orientedx3 speech clear. No deficits   MSK: FROM all 4 extremities, 4/5 strength. Unstable on feet  SKIN: No rashes or lesions    MEDICATIONS  (STANDING):  azithromycin   Tablet 500 milliGRAM(s) Oral daily  cefTRIAXone   IVPB 1000 milliGRAM(s) IV Intermittent every 24 hours  enoxaparin Injectable 40 milliGRAM(s) SubCutaneous every 24 hours  folic acid 1 milliGRAM(s) Oral daily  influenza  Vaccine (HIGH DOSE) 0.7 milliLiter(s) IntraMuscular once  pantoprazole    Tablet 40 milliGRAM(s) Oral before breakfast  sodium chloride 0.9%. 1000 milliLiter(s) (63 mL/Hr) IV Continuous <Continuous>  thiamine IVPB 250 milliGRAM(s) IV Intermittent daily    MEDICATIONS  (PRN):  LORazepam   Injectable 2 milliGRAM(s) IV Push every 1 hour PRN Symptom-triggered: each CIWA -Ar score 8 or GREATER  LORazepam   Injectable 2 milliGRAM(s) IV Push every 2 hours PRN Symptom-triggered: 2 point increase in CIWA -Ar score and a total score of 7 or LESS  ondansetron Injectable 4 milliGRAM(s) IV Push every 4 hours PRN Nausea and/or Vomiting      LABS: All Labs Reviewed:                                   16.3   5.65  )-----------( 209      ( 21 Jun 2022 09:01 )             49.3     06-21    138  |  103  |  12  ----------------------------<  102<H>  4.2   |  27  |  0.73    Ca    9.6      21 Jun 2022 09:01  Phos  4.0     06-20  Mg     2.8     06-20    TPro  7.1  /  Alb  2.0<L>  /  TBili  0.4  /  DBili  x   /  AST  63<H>  /  ALT  58  /  AlkPhos  82  06-21    Blood Culture: 06-17 @ 15:07  Organism --  Gram Stain Blood -- Gram Stain --  Specimen Source .Blood None  Culture-Blood --      I&O's Summary    CAPILLARY BLOOD GLUCOSE      EKG/RADIOLOGY  < from: Xray Chest 1 View- PORTABLE-Urgent (06.17.22 @ 15:43) >    IMPRESSION:   LEFT mid zone peripheral focal airspace disease. Follow-up   chest radiograph recommended.    --- End of Report ---    < end of copied text >    < from: CT Head No Cont (06.17.22 @ 15:26) >    IMPRESSION:  Minimal chronic microvascular changes without evidence ofan   acute transcortical infarction or hemorrhage.    < end of copied text >

## 2022-06-22 LAB
ALBUMIN SERPL ELPH-MCNC: 1.9 G/DL — LOW (ref 3.3–5)
ALP SERPL-CCNC: 71 U/L — SIGNIFICANT CHANGE UP (ref 40–120)
ALT FLD-CCNC: 48 U/L — SIGNIFICANT CHANGE UP (ref 12–78)
ANION GAP SERPL CALC-SCNC: 7 MMOL/L — SIGNIFICANT CHANGE UP (ref 5–17)
AST SERPL-CCNC: 38 U/L — HIGH (ref 15–37)
BILIRUB SERPL-MCNC: 0.4 MG/DL — SIGNIFICANT CHANGE UP (ref 0.2–1.2)
BUN SERPL-MCNC: 16 MG/DL — SIGNIFICANT CHANGE UP (ref 7–23)
CALCIUM SERPL-MCNC: 9 MG/DL — SIGNIFICANT CHANGE UP (ref 8.5–10.1)
CHLORIDE SERPL-SCNC: 106 MMOL/L — SIGNIFICANT CHANGE UP (ref 96–108)
CO2 SERPL-SCNC: 26 MMOL/L — SIGNIFICANT CHANGE UP (ref 22–31)
CREAT SERPL-MCNC: 0.85 MG/DL — SIGNIFICANT CHANGE UP (ref 0.5–1.3)
CULTURE RESULTS: SIGNIFICANT CHANGE UP
CULTURE RESULTS: SIGNIFICANT CHANGE UP
EGFR: 95 ML/MIN/1.73M2 — SIGNIFICANT CHANGE UP
GLUCOSE SERPL-MCNC: 103 MG/DL — HIGH (ref 70–99)
HCT VFR BLD CALC: 45.1 % — SIGNIFICANT CHANGE UP (ref 39–50)
HGB BLD-MCNC: 15 G/DL — SIGNIFICANT CHANGE UP (ref 13–17)
MCHC RBC-ENTMCNC: 33.1 PG — SIGNIFICANT CHANGE UP (ref 27–34)
MCHC RBC-ENTMCNC: 33.3 GM/DL — SIGNIFICANT CHANGE UP (ref 32–36)
MCV RBC AUTO: 99.6 FL — SIGNIFICANT CHANGE UP (ref 80–100)
ORGANISM # SPEC MICROSCOPIC CNT: SIGNIFICANT CHANGE UP
ORGANISM # SPEC MICROSCOPIC CNT: SIGNIFICANT CHANGE UP
PLATELET # BLD AUTO: 250 K/UL — SIGNIFICANT CHANGE UP (ref 150–400)
POTASSIUM SERPL-MCNC: 4 MMOL/L — SIGNIFICANT CHANGE UP (ref 3.5–5.3)
POTASSIUM SERPL-SCNC: 4 MMOL/L — SIGNIFICANT CHANGE UP (ref 3.5–5.3)
PROT SERPL-MCNC: 6.5 GM/DL — SIGNIFICANT CHANGE UP (ref 6–8.3)
RBC # BLD: 4.53 M/UL — SIGNIFICANT CHANGE UP (ref 4.2–5.8)
RBC # FLD: 14 % — SIGNIFICANT CHANGE UP (ref 10.3–14.5)
SODIUM SERPL-SCNC: 139 MMOL/L — SIGNIFICANT CHANGE UP (ref 135–145)
SPECIMEN SOURCE: SIGNIFICANT CHANGE UP
SPECIMEN SOURCE: SIGNIFICANT CHANGE UP
WBC # BLD: 5.69 K/UL — SIGNIFICANT CHANGE UP (ref 3.8–10.5)
WBC # FLD AUTO: 5.69 K/UL — SIGNIFICANT CHANGE UP (ref 3.8–10.5)

## 2022-06-22 PROCEDURE — 99232 SBSQ HOSP IP/OBS MODERATE 35: CPT | Mod: GC

## 2022-06-22 RX ORDER — ACETAMINOPHEN 500 MG
650 TABLET ORAL EVERY 6 HOURS
Refills: 0 | Status: DISCONTINUED | OUTPATIENT
Start: 2022-06-22 | End: 2022-06-24

## 2022-06-22 RX ADMIN — ENOXAPARIN SODIUM 40 MILLIGRAM(S): 100 INJECTION SUBCUTANEOUS at 00:19

## 2022-06-22 RX ADMIN — Medication 102.5 MILLIGRAM(S): at 10:51

## 2022-06-22 RX ADMIN — Medication 1 MILLIGRAM(S): at 08:49

## 2022-06-22 RX ADMIN — CEFTRIAXONE 100 MILLIGRAM(S): 500 INJECTION, POWDER, FOR SOLUTION INTRAMUSCULAR; INTRAVENOUS at 08:48

## 2022-06-22 RX ADMIN — Medication 650 MILLIGRAM(S): at 22:00

## 2022-06-22 RX ADMIN — AZITHROMYCIN 500 MILLIGRAM(S): 500 TABLET, FILM COATED ORAL at 08:49

## 2022-06-22 RX ADMIN — SODIUM CHLORIDE 63 MILLILITER(S): 9 INJECTION INTRAMUSCULAR; INTRAVENOUS; SUBCUTANEOUS at 05:04

## 2022-06-22 RX ADMIN — PANTOPRAZOLE SODIUM 40 MILLIGRAM(S): 20 TABLET, DELAYED RELEASE ORAL at 05:12

## 2022-06-22 RX ADMIN — Medication 650 MILLIGRAM(S): at 21:24

## 2022-06-22 NOTE — CDI QUERY NOTE - NSCDINOTECODERNAME_GEN_A_CORE_FT
ASTHMA ACTION PLAN for Kain Carrillo     : 1998     Date: 2020  Provider:  Amy Hutchison DO  Phone for doctor or clinic: [address] [facility] [phone]- Kourtney Conway RN CCS

## 2022-06-22 NOTE — PROGRESS NOTE ADULT - SUBJECTIVE AND OBJECTIVE BOX
Date of service: 22 @ 09:12    Lying in bed in NAD  Alert and verbal  Has dry cough  SOB is improving    ROS: no fever or chills; denies dizziness, no HA, no abdominal pain, no diarrhea or constipation; no dysuria, no legs pain, no rashes    MEDICATIONS  (STANDING):  azithromycin   Tablet 500 milliGRAM(s) Oral daily  cefTRIAXone   IVPB 1000 milliGRAM(s) IV Intermittent every 24 hours  enoxaparin Injectable 40 milliGRAM(s) SubCutaneous every 24 hours  folic acid 1 milliGRAM(s) Oral daily  influenza  Vaccine (HIGH DOSE) 0.7 milliLiter(s) IntraMuscular once  pantoprazole    Tablet 40 milliGRAM(s) Oral before breakfast  sodium chloride 0.9%. 1000 milliLiter(s) (63 mL/Hr) IV Continuous <Continuous>  thiamine IVPB 250 milliGRAM(s) IV Intermittent daily    Vital Signs Last 24 Hrs  T(C): 36.9 (2022 08:10), Max: 36.9 (2022 08:10)  T(F): 98.4 (2022 08:10), Max: 98.4 (2022 08:10)  HR: 73 (2022 08:10) (73 - 86)  BP: 100/58 (2022 08:10) (100/58 - 114/98)  BP(mean): --  RR: 20 (2022 08:10) (18 - 20)  SpO2: 99% (2022 08:10) (94% - 99%)     Physical exam:    Constitutional:  No acute distress  HEENT: NC/AT, EOMI, PERRLA, conjunctivae clear; ears and nose atraumatic  Neck: supple; thyroid not palpable  Back: no tenderness  Respiratory: respiratory effort normal; crackles at left base  Cardiovascular: S1S2 regular, no murmurs  Abdomen: soft, not tender, not distended, positive BS  Genitourinary: no suprapubic tenderness  Lymphatic: no LN palpable  Musculoskeletal: no muscle tenderness, no joint swelling or tenderness  Extremities: no pedal edema  Neurological/ Psychiatric: Alert, moving all extremities  Skin: no rashes; no palpable lesions    Labs: reviewed                        16.3   5.65  )-----------( 209      ( 2022 09:01 )             49.3     06-21    138  |  103  |  12  ----------------------------<  102<H>  4.2   |  27  |  0.73    Ca    9.6      2022 09:01    TPro  7.1  /  Alb  2.0<L>  /  TBili  0.4  /  DBili  x   /  AST  63<H>  /  ALT  58  /  AlkPhos  82  06-21                        15.7   8.48  )-----------( 157      ( 2022 15:07 )             46.8     06-18    134<L>  |  104  |  7   ----------------------------<  113<H>  3.9   |  22  |  0.92    Ca    8.7      2022 07:48  Phos  2.8     06-18  Mg     2.4     06-18    TPro  6.3  /  Alb  2.1<L>  /  TBili  0.5  /  DBili  x   /  AST  26  /  ALT  26  /  AlkPhos  68  06-18     LIVER FUNCTIONS - ( 2022 07:48 )  Alb: 2.1 g/dL / Pro: 6.3 gm/dL / ALK PHOS: 68 U/L / ALT: 26 U/L / AST: 26 U/L / GGT: x           Urinalysis Basic - ( 2022 15:07 )    Color: Yellow / Appearance: Clear / S.015 / pH: x  Gluc: x / Ketone: Negative  / Bili: Negative / Urobili: 1   Blood: x / Protein: 100 / Nitrite: Negative   Leuk Esterase: Trace / RBC: 6-10 /HPF / WBC 0-2   Sq Epi: x / Non Sq Epi: Occasional / Bacteria: Moderate    ( @ 15:07)  NotDetec      Culture - Urine (collected 2022 15:07)  Source: Clean Catch None  Final Report (2022 19:18):    <10,000 CFU/mL Normal Urogenital Jaqueline    Culture - Blood (collected 2022 15:07)  Source: .Blood None  Preliminary Report (2022 22:01):    No growth to date.    Culture - Blood (collected 2022 15:07)  Source: .Blood None  Preliminary Report (2022 22:01):    No growth to date.    Radiology: all available radiological tests reviewed    < from: Xray Chest 1 View- PORTABLE-Urgent (22 @ 15:43) >  IMPRESSION:   LEFT mid zone peripheral focal airspace disease.  < end of copied text >    Advanced directives addressed: full resuscitation

## 2022-06-22 NOTE — PROGRESS NOTE ADULT - ASSESSMENT
Patient is a 65 yo male current day smoker (5 cigarettes daily) and Chronic ETOH missuse (10+ years) with a PMH of GERD who presents with weakness, frequency, urge incontinence x 2 weeks.     #Coarse breathing, snoring 2/2 SOLOMON in setting of PNA (improved)  - Pt with improved breathing, snoring/coarse breathing reported primarily at night  on O2 NC.  - CXR, CTA chest reviewed, no PE, JENNIFER PNA  - CPAP at night  - S/p azithromycin 5 d course, C/w Ceftriaxone    #Acute Alcohol Withdrawal (improved)  #Confusion 2/2 Wernicke's  #Hx of ETOH Abuse  - After admission, pt was found to be showing sxs of withdrawal, high CIWA scores, most recently 5-6  - Pt w/long hx of ETOH abuse, now confused,  ETOH level less than 10 on admission, CIWAs 0-2  - Pt was going through acute withdrawal on the floor, however clinical status has improved  - S/p ativan taper, c/w symptom triggered ativan PRN.   - C/w thiamine 500mg IV TID and folate, Now switched to Thiamine 250mg IV Qd x 5 days  - Neurology consult appreciated; cont abx, cont thiamine supplementation    #Weakness 2/2 Likely Acute UTI, Subacute Pneumonia  #Urge incontinence  - CT Head positive for microvascular changes  - SIRS 1/4 temp 100.4 F Mod QSofa 1/3 AMS  - POCT UA- +ve for Protein, +ve bacteria, +ve blood  - S/P Ceftriaxone and 2L bolus in Ed  - S/p Zosyn IV; ID recs: S/p azithromycin 5 d course, C/w ceftriaxone  - Blood/Urine Cx NGTD  - Neurology on Board- Less likely NPH  - MRI Head negative for acute pathology  - MRI L-spine negative for acute fractures or dislocations, 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 Consider cystoscopy if symptoms persists  - C/w pt recs  - C/w bladder sans  - Low threshold to order CT abdomen/pelvis if spikes another temp     #GERD  -C/w Omeprazole PO daily     #Code status  -Full Code    #DVT pppx  -Lovenox 40U Daily     Case discussed with Dr. Shepherd Patient is a 67 yo male current day smoker (5 cigarettes daily) and Chronic ETOH missuse (10+ years) with a PMH of GERD who presents with weakness, frequency, urge incontinence x 2 weeks.     #Coarse breathing, snoring 2/2 SOLOMON in setting of PNA (improved)  - Pt with improved breathing, snoring/coarse breathing reported primarily at night  on O2 NC.  - CXR, CTA chest reviewed, no PE, JENNIFER PNA  - CPAP at night  - S/p azithromycin 5 d course, C/w Ceftriaxone    #Acute Alcohol Withdrawal (improved)  #Confusion 2/2 Wernicke's  #Hx of ETOH Abuse  - After admission, pt was found to be showing sxs of withdrawal, high CIWA scores, most recently 5-6  - Pt w/long hx of ETOH abuse, now confused,  ETOH level less than 10 on admission, CIWAs 0-2  - Pt was going through acute withdrawal on the floor, however clinical status has improved  - S/p ativan taper, c/w symptom triggered ativan PRN.   - C/w thiamine 500mg IV TID and folate, Now switched to Thiamine 250mg IV Qd x 5 days  - Neurology consult appreciated; cont abx, cont thiamine supplementation    #Weakness 2/2 Likely Acute UTI, Subacute Community Acquired Pneumonia  #Urge incontinence  - CT Head positive for microvascular changes  - SIRS 1/4 temp 100.4 F Mod QSofa 1/3 AMS  - POCT UA- +ve for Protein, +ve bacteria, +ve blood  - S/P Ceftriaxone and 2L bolus in Ed  - S/p Zosyn IV; ID recs: S/p azithromycin 5 d course, C/w ceftriaxone  - Blood/Urine Cx NGTD  - Neurology on Board- Less likely NPH  - MRI Head negative for acute pathology  - MRI L-spine negative for acute fractures or dislocations, 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 Consider cystoscopy if symptoms persists  - C/w pt recs  - C/w bladder sans  - Low threshold to order CT abdomen/pelvis if spikes another temp     #GERD  -C/w Omeprazole PO daily     #Code status  -Full Code    #DVT pppx  -Lovenox 40U Daily     Case discussed with Dr. Shepherd

## 2022-06-22 NOTE — PROGRESS NOTE ADULT - ASSESSMENT
65 y/o male with h/o GERD was admitted on 6/17 for weakness, urinary frequency, urge incontinence x 2 weeks. Patient reports he went to see Dr. Ferguson (PCP) on the day of admission for this problems and was told to come to the ER. Pt reports he noticed increased weakness to his lower extremities (having trouble standing up and walking, with shuffling gait) for the past 2 days. Patient has nocturia (Voids 3-4x) a night for past 5 years. Spouse notes patient with increased confusion and short term memory loss in the past week. Denies fever, chills, HA, recent falls. In the ED patient VS were temp 100.4F and received ceftriaxone.     1. Low grade fever resolving. Probable UTI and/or prostatitis. Possible LLL pneumonia.   -encephalopathy  -BC x 2, urine c/s noted  -on ceftriaxone 1 gm IV qd and azithromycin 500 mg PO qd # 5  -tolerating abx well so far; no side effects noted  -more alert  -d/c azithromycin  -continue abx coverage with ceftriaxone  -f/u cultures  -monitor temps  -f/u CBC  -supportive care  2. Other issues:   -care per medicine

## 2022-06-22 NOTE — PROGRESS NOTE ADULT - SUBJECTIVE AND OBJECTIVE BOX
HPI:  Patient is a 67 yo male current day smoker (5 cigarettes daily) and Chronic ETOH missuse (10+ years) with a PMH of GERD who presents with weakness, frequency, urge incontinence x 2 weeks. Patient reports he went to see Dr. Das(PCP) today for this problems and was told to come to the ER. Pt reports he noticed increased weakness to his lower extremities (having trouble standing up and walking, with shuffling gait) for the past 2 days. Patient has a chronic hx of nocturia (Voids 3-4x) a night for past 5 years and has not been evaluated for this medical issue. Spouse notes patient with increased confusion and short term memory loss in the past week. Denies hx of UTIs or ETOH withdrawals. Denies fevers, chills, HA, recent falls, urinary hesitancy. Last etoh drink was yesterday night. In the ED patient VS were temp 100.4, /71, RR 18, Pulse ox 97. CT head was unremarkable for intracranial bleeding, POCT UA was positive for blood and bacteria. Pt was given 2L of fluids and ceftriaxone.     6/18/22: No events since ED. Pt reports no fever but still pleasantly confused. No ha, dizziness, cp, palpitations, n/v  6/19/22: Pt seen at evaluated at bedside. Place on CIWA ativan taper overnight for increased agitation. Pt opens eyes to name being called but sleepy. Pt's wife, Beatrice at bedside.   6/20/22: Overnight, pt was still showing withdrawal sxs w/ CIWA scores 5-6, showing low grade T/tachycardias. Pt lethargic today s/p ativan but rousable; pt became hypoxic earlier and required O2 NC. Has coarse breathing and snoring but in NAD, O2 stats table.  6/21/22: Pt seen and examined at bedside. Pt much more alert than yesterday. Pt is oriented to person and place. Pt denies any acute complaints. No overnight events. CTA chest reviewed and discussed with pt; showing JENNIFER PNA. Discussed starting CPAP at night and pt is agreeable.  6/22/22: No overnight events other than pt not receiving C-PAP; his CIWAs have been in the 0-2's. Today pt is doing better, with mental status continuing to improve. No other symptoms or complaints reported at this time.  Pt denies ha, cp, palpitations, n/v. ROS as stated above.    Vital Signs Last 24 Hrs  T(C): 36.9 (22 Jun 2022 08:10), Max: 36.9 (22 Jun 2022 08:10)  T(F): 98.4 (22 Jun 2022 08:10), Max: 98.4 (22 Jun 2022 08:10)  HR: 73 (22 Jun 2022 08:10) (73 - 86)  BP: 100/58 (22 Jun 2022 08:10) (100/58 - 114/98)  BP(mean): --  RR: 20 (22 Jun 2022 08:10) (18 - 20)  SpO2: 99% (22 Jun 2022 08:10) (94% - 99%)    GENERAL: NAD, lying in bed, much more alert today  HEAD:  Atraumatic, Normocephalic  EYES: EOMI, PERRLA, conjunctiva and sclera clear  ENT: Moist mucous membranes  NECK: Supple, No JVD  Pulm: Some mild b/l crackles No wheezing, or rubs. Unlabored respirations  Cardio: Regular rate and rhythm; No murmurs, rubs, or gallops  Gastro: Bowel sounds present; Soft, Nontender, Nondistended. No hepatomegally  EXTREMITIES:  2+ Peripheral Pulses, brisk capillary refill. No clubbing, cyanosis, or edema  NERVOUS SYSTEM:  Alert & Orientedx3 speech clear. No deficits   MSK: FROM all 4 extremities, 4/5 strength. Unstable on feet  SKIN: No rashes or lesions    67y YO Male With a PMH of  PAST MEDICAL & SURGICAL HISTORY:  No pertinent past surgical history      Patient is a 67y old  Male who presents with a chief complaint of Weakness and LUTS (22 Jun 2022 09:11)      Subjective: Patient was seen and examined by me this morning at bedside.     REVIEW OF SYSTEMS:  CONSTITUTIONAL: No weakness, fevers or chills  EYES/ENT: No visual changes;  No vertigo or throat pain   NECK: No pain or stiffness  RESPIRATORY: No cough, wheezing, hemoptysis; No shortness of breath  CARDIOVASCULAR: No chest pain or palpitations  GASTROINTESTINAL: No abdominal or epigastric pain. No nausea, vomiting; No diarrhea or constipation.   GENITOURINARY: No dysuria, frequency or hematuria  NEUROLOGICAL: No numbness or weakness  SKIN: No itching, burning, rashes, or lesions       Vital Signs Last 24 Hrs  T(C): 36.9 (22 Jun 2022 08:10), Max: 36.9 (22 Jun 2022 08:10)  T(F): 98.4 (22 Jun 2022 08:10), Max: 98.4 (22 Jun 2022 08:10)  HR: 73 (22 Jun 2022 08:10) (73 - 86)  BP: 100/58 (22 Jun 2022 08:10) (100/58 - 114/98)  BP(mean): --  RR: 20 (22 Jun 2022 08:10) (18 - 20)  SpO2: 99% (22 Jun 2022 08:10) (94% - 99%)    PHYSICAL EXAM  Constitutional: Pt lying in bed, awake and alert, NAD  HEENT: EOMI, normocephalic, moist mucous membranes  Neck: Soft and supple, no JVD  Respiratory: CTABL, No wheezing, rales or rhonchi  Cardiovascular: S1S2+, RRR, no M/G/R  Gastrointestinal: BS+, soft, NT/ND, no guarding, no rebound  Extremities: No calf pain  Vascular: Peripheral pulses present  Neurological: AAOx3, no focal deficits  Musculoskeletal: 5/5 strength b/l upper and lower extremities  Skin: No significant new skin lesions or rashes    MEDICATIONS:  MEDICATIONS  (STANDING):  cefTRIAXone   IVPB 1000 milliGRAM(s) IV Intermittent every 24 hours  enoxaparin Injectable 40 milliGRAM(s) SubCutaneous every 24 hours  folic acid 1 milliGRAM(s) Oral daily  influenza  Vaccine (HIGH DOSE) 0.7 milliLiter(s) IntraMuscular once  pantoprazole    Tablet 40 milliGRAM(s) Oral before breakfast  sodium chloride 0.9%. 1000 milliLiter(s) (63 mL/Hr) IV Continuous <Continuous>  thiamine IVPB 250 milliGRAM(s) IV Intermittent daily    MEDICATIONS  (PRN):  LORazepam   Injectable 2 milliGRAM(s) IV Push every 1 hour PRN Symptom-triggered: each CIWA -Ar score 8 or GREATER  LORazepam   Injectable 2 milliGRAM(s) IV Push every 2 hours PRN Symptom-triggered: 2 point increase in CIWA -Ar score and a total score of 7 or LESS  ondansetron Injectable 4 milliGRAM(s) IV Push every 4 hours PRN Nausea and/or Vomiting      LABS: All Labs Reviewed:                        15.0   5.69  )-----------( 250      ( 22 Jun 2022 09:42 )             45.1     06-22    139  |  106  |  16  ----------------------------<  103<H>  4.0   |  26  |  0.85    Ca    9.0      22 Jun 2022 09:42    TPro  6.5  /  Alb  1.9<L>  /  TBili  0.4  /  DBili  x   /  AST  38<H>  /  ALT  48  /  AlkPhos  71  06-22          Blood Culture: 06-17 @ 15:07  Organism Blood Culture PCR  Gram Stain Blood -- Gram Stain   Growth in aerobic bottle: Gram Positive Rods  Specimen Source .Blood None  Culture-Blood --      I&O's Summary    CAPILLARY BLOOD GLUCOSE            RADIOLOGY/EKG:        EKG/RADIOLOGY  < from: Xray Chest 1 View- PORTABLE-Urgent (06.17.22 @ 15:43) >    IMPRESSION:   LEFT mid zone peripheral focal airspace disease. Follow-up   chest radiograph recommended.    --- End of Report ---    < end of copied text >    < from: CT Head No Cont (06.17.22 @ 15:26) >    IMPRESSION:  Minimal chronic microvascular changes without evidence ofan   acute transcortical infarction or hemorrhage.    < end of copied text >

## 2022-06-22 NOTE — CDI QUERY NOTE - NSCDIOTHERTXTBX_GEN_ALL_CORE_HH
Please specify type of Pneumonia known or suspected/probable/likely:    A) Aspiration pneumonia   B) Gram-negative   C) Community acquired pneumonia   D) Other (please specify) __________.   E) Unable to determine     Supporting Documentation and/or Clinical Evidence:    PORTABLE-Urgent (Xray Chest 1 View- PORTABLE-Urgent .) (06.20.22 @ 13:05) Increasing peripheral left upper lobe opacity may represent infarct,   pneumonia or mass.  CT Angio Chest PE Protocol w/ IV Cont (06.20.22 @ 22:46) Findings suspicious for left upper lobe pneumonia.     Adult-Infectious disease 6/22/2022  Low grade fever resolving. Probable UTI and/or prostatitis. Possible LLL pneumonia.  -on ceftriaxone 1 gm IV qd and azithromycin 500 mg PO qd # 5  -d/c azithromycin  -continue abx coverage with ceftriaxone    Adult-Family resident attending progress note 6/21/2022  Coarse breathing, snoring 2/2 SOLOMON, however will r/o aspiration PNA  Weakness 2/2 Likely Acute UTI, Subacute Pneumonia  After admission, pt was found to be showing sxs of withdrawal, high CIWA scores, most recently 5-6  - Pt w/long hx of ETOH abuse, now confused,  ETOH level less than 10 on admission, CIWAs 0-2

## 2022-06-23 ENCOUNTER — TRANSCRIPTION ENCOUNTER (OUTPATIENT)
Age: 67
End: 2022-06-23

## 2022-06-23 LAB
ALBUMIN SERPL ELPH-MCNC: 2 G/DL — LOW (ref 3.3–5)
ALP SERPL-CCNC: 65 U/L — SIGNIFICANT CHANGE UP (ref 40–120)
ALT FLD-CCNC: 45 U/L — SIGNIFICANT CHANGE UP (ref 12–78)
ANION GAP SERPL CALC-SCNC: 8 MMOL/L — SIGNIFICANT CHANGE UP (ref 5–17)
AST SERPL-CCNC: 39 U/L — HIGH (ref 15–37)
BILIRUB SERPL-MCNC: 0.5 MG/DL — SIGNIFICANT CHANGE UP (ref 0.2–1.2)
BUN SERPL-MCNC: 12 MG/DL — SIGNIFICANT CHANGE UP (ref 7–23)
CALCIUM SERPL-MCNC: 8.8 MG/DL — SIGNIFICANT CHANGE UP (ref 8.5–10.1)
CHLORIDE SERPL-SCNC: 110 MMOL/L — HIGH (ref 96–108)
CO2 SERPL-SCNC: 23 MMOL/L — SIGNIFICANT CHANGE UP (ref 22–31)
CREAT SERPL-MCNC: 0.73 MG/DL — SIGNIFICANT CHANGE UP (ref 0.5–1.3)
EGFR: 100 ML/MIN/1.73M2 — SIGNIFICANT CHANGE UP
GLUCOSE SERPL-MCNC: 94 MG/DL — SIGNIFICANT CHANGE UP (ref 70–99)
HCT VFR BLD CALC: 44.4 % — SIGNIFICANT CHANGE UP (ref 39–50)
HGB BLD-MCNC: 14.6 G/DL — SIGNIFICANT CHANGE UP (ref 13–17)
MCHC RBC-ENTMCNC: 32.3 PG — SIGNIFICANT CHANGE UP (ref 27–34)
MCHC RBC-ENTMCNC: 32.9 GM/DL — SIGNIFICANT CHANGE UP (ref 32–36)
MCV RBC AUTO: 98.2 FL — SIGNIFICANT CHANGE UP (ref 80–100)
PLATELET # BLD AUTO: 302 K/UL — SIGNIFICANT CHANGE UP (ref 150–400)
POTASSIUM SERPL-MCNC: 4 MMOL/L — SIGNIFICANT CHANGE UP (ref 3.5–5.3)
POTASSIUM SERPL-SCNC: 4 MMOL/L — SIGNIFICANT CHANGE UP (ref 3.5–5.3)
PROT SERPL-MCNC: 6 GM/DL — SIGNIFICANT CHANGE UP (ref 6–8.3)
RBC # BLD: 4.52 M/UL — SIGNIFICANT CHANGE UP (ref 4.2–5.8)
RBC # FLD: 13.9 % — SIGNIFICANT CHANGE UP (ref 10.3–14.5)
SODIUM SERPL-SCNC: 141 MMOL/L — SIGNIFICANT CHANGE UP (ref 135–145)
WBC # BLD: 5.12 K/UL — SIGNIFICANT CHANGE UP (ref 3.8–10.5)
WBC # FLD AUTO: 5.12 K/UL — SIGNIFICANT CHANGE UP (ref 3.8–10.5)

## 2022-06-23 PROCEDURE — 99233 SBSQ HOSP IP/OBS HIGH 50: CPT | Mod: GC

## 2022-06-23 PROCEDURE — 76700 US EXAM ABDOM COMPLETE: CPT | Mod: 26

## 2022-06-23 RX ORDER — NICOTINE POLACRILEX 2 MG
1 GUM BUCCAL DAILY
Refills: 0 | Status: DISCONTINUED | OUTPATIENT
Start: 2022-06-23 | End: 2022-06-23

## 2022-06-23 RX ORDER — CEFUROXIME AXETIL 250 MG
500 TABLET ORAL EVERY 12 HOURS
Refills: 0 | Status: DISCONTINUED | OUTPATIENT
Start: 2022-06-23 | End: 2022-06-24

## 2022-06-23 RX ORDER — NICOTINE POLACRILEX 2 MG
2 GUM BUCCAL THREE TIMES A DAY
Refills: 0 | Status: DISCONTINUED | OUTPATIENT
Start: 2022-06-23 | End: 2022-06-24

## 2022-06-23 RX ADMIN — ENOXAPARIN SODIUM 40 MILLIGRAM(S): 100 INJECTION SUBCUTANEOUS at 23:59

## 2022-06-23 RX ADMIN — ONDANSETRON 4 MILLIGRAM(S): 8 TABLET, FILM COATED ORAL at 22:52

## 2022-06-23 RX ADMIN — ONDANSETRON 4 MILLIGRAM(S): 8 TABLET, FILM COATED ORAL at 15:49

## 2022-06-23 RX ADMIN — Medication 1 PATCH: at 13:32

## 2022-06-23 RX ADMIN — ENOXAPARIN SODIUM 40 MILLIGRAM(S): 100 INJECTION SUBCUTANEOUS at 00:30

## 2022-06-23 RX ADMIN — Medication 1 MILLIGRAM(S): at 11:39

## 2022-06-23 RX ADMIN — SODIUM CHLORIDE 63 MILLILITER(S): 9 INJECTION INTRAMUSCULAR; INTRAVENOUS; SUBCUTANEOUS at 11:59

## 2022-06-23 RX ADMIN — PANTOPRAZOLE SODIUM 40 MILLIGRAM(S): 20 TABLET, DELAYED RELEASE ORAL at 05:52

## 2022-06-23 RX ADMIN — Medication 102.5 MILLIGRAM(S): at 11:39

## 2022-06-23 RX ADMIN — SODIUM CHLORIDE 63 MILLILITER(S): 9 INJECTION INTRAMUSCULAR; INTRAVENOUS; SUBCUTANEOUS at 00:34

## 2022-06-23 RX ADMIN — Medication 500 MILLIGRAM(S): at 11:39

## 2022-06-23 RX ADMIN — Medication 500 MILLIGRAM(S): at 22:10

## 2022-06-23 NOTE — PROVIDER CONTACT NOTE (OTHER) - ASSESSMENT
Patient had x2 episodes of vomiting. Nicotine patch was placed and patient vomited shortly after.  Spouse says patient throws up at home.

## 2022-06-23 NOTE — PROGRESS NOTE ADULT - PROVIDER SPECIALTY LIST ADULT
Infectious Disease
Family Medicine
Infectious Disease
Infectious Disease
Neurology
Family Medicine
Infectious Disease

## 2022-06-23 NOTE — PROGRESS NOTE ADULT - REASON FOR ADMISSION
Weakness and LUTS

## 2022-06-23 NOTE — PROGRESS NOTE ADULT - ATTENDING COMMENTS
65 yo male current day smoker (5 cigarettes daily) and Chronic ETOH missuse (10+ years) with a PMH of GERD who presents with weakness, frequency, urge incontinence x 2 weeks.     #Coarse breathing, snoring 2/2 SOLOMON, however will r/o aspiration PNA  - Pt currently somnulent but rousable, coarse breathing, snoring but NAD, stable on O2 NC.  - Ordered CXR, will f/u results  - Ordered bipap  - C/w current abx
65 yo male current day smoker (5 cigarettes daily) and Chronic ETOH missuse (10+ years) with a PMH of GERD who presents with weakness, frequency, urge incontinence x 2 weeks.     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 Consider cystoscopy if symptoms persists  -Low threshold to order CT abdomen/pelvis if spikes another temp
UTI/PNA resolving  DC planning
Ciwa improved  UTI and Pneumonia clinically improving  continue care  DC planning
ETOH withdrawal, improving  Pneumonia/UTI continue IV abx
65 yo male current day smoker (5 cigarettes daily) and Chronic ETOH missuse (10+ years) with a PMH of GERD who presents with weakness, frequency, urge incontinence x 2 weeks.     #Weakness 2/2 Likely Acute UTI, possible Subacute Pneumonia  #Urge incontinence  -Admit to 2S  -HD Stable  -CT Head positive for microvascular changes  -SIRS 1/4 temp 100.4 F Mod QSofa 1/3 AMS  -POCT UA- +ve for Protein, +ve bacteria, +ve blood  -S/P Ceftriaxone and 2L bolus in Ed  -S/p Zosyn IV; ID recs: ceftriaxone + azithromycin (day 1)  -FU urine and blood cultures  - on admission Repeat 134  -Neurology on Board- Less likely NPH. F/U MRI head  -Ordered ID consult  -Ordered PT Consult

## 2022-06-23 NOTE — PROGRESS NOTE ADULT - SUBJECTIVE AND OBJECTIVE BOX
HPI:  Patient is a 67 yo male current day smoker (5 cigarettes daily) and Chronic ETOH missuse (10+ years) with a PMH of GERD who presents with weakness, frequency, urge incontinence x 2 weeks. Patient reports he went to see Dr. Das(PCP) today for this problems and was told to come to the ER. Pt reports he noticed increased weakness to his lower extremities (having trouble standing up and walking, with shuffling gait) for the past 2 days. Patient has a chronic hx of nocturia (Voids 3-4x) a night for past 5 years and has not been evaluated for this medical issue. Spouse notes patient with increased confusion and short term memory loss in the past week. Denies hx of UTIs or ETOH withdrawals. Denies fevers, chills, HA, recent falls, urinary hesitancy. Last etoh drink was yesterday night. In the ED patient VS were temp 100.4, /71, RR 18, Pulse ox 97. CT head was unremarkable for intracranial bleeding, POCT UA was positive for blood and bacteria. Pt was given 2L of fluids and ceftriaxone.     6/18/22: No events since ED. Pt reports no fever but still pleasantly confused. No ha, dizziness, cp, palpitations, n/v  6/19/22: Pt seen at evaluated at bedside. Place on CIWA ativan taper overnight for increased agitation. Pt opens eyes to name being called but sleepy. Pt's wife, Beatrice at bedside.   6/20/22: Overnight, pt was still showing withdrawal sxs w/ CIWA scores 5-6, showing low grade T/tachycardias. Pt lethargic today s/p ativan but rousable; pt became hypoxic earlier and required O2 NC. Has coarse breathing and snoring but in NAD, O2 stats table.  6/21/22: Pt seen and examined at bedside. Pt much more alert than yesterday. Pt is oriented to person and place. Pt denies any acute complaints. No overnight events. CTA chest reviewed and discussed with pt; showing JENNIFER PNA. Discussed starting CPAP at night and pt is agreeable.  6/22/22: No overnight events other than pt not receiving C-PAP; his CIWAs have been in the 0-2's. Today pt is doing better, with mental status continuing to improve. No other symptoms or complaints reported at this time.  6/23/22/. No overnight events; CIWAs in 0's. Pt feeling ok other than noting he is a little week. No other symptoms or complaints reported at this time.  Pt denies ha, cp, palpitations, n/v. ROS as stated above.    Vital Signs Last 24 Hrs  T(C): 36.7 (23 Jun 2022 08:30), Max: 37.1 (22 Jun 2022 16:00)  T(F): 98.1 (23 Jun 2022 08:30), Max: 98.8 (22 Jun 2022 16:00)  HR: 67 (23 Jun 2022 08:30) (65 - 80)  BP: 107/57 (23 Jun 2022 08:30) (100/48 - 117/64)  BP(mean): --  RR: 20 (23 Jun 2022 08:30) (16 - 20)  SpO2: 97% (23 Jun 2022 08:30) (94% - 99%)  GENERAL: NAD, lying in bed, much more alert today  HEAD:  Atraumatic, Normocephalic  EYES: EOMI, PERRLA, conjunctiva and sclera clear  ENT: Moist mucous membranes  NECK: Supple, No JVD  Pulm: Some mild b/l crackles No wheezing, or rubs. Unlabored respirations  Cardio: Regular rate and rhythm; No murmurs, rubs, or gallops  Gastro: Bowel sounds present; Soft, Nontender, Nondistended. No hepatomegally  EXTREMITIES:  2+ Peripheral Pulses, brisk capillary refill. No clubbing, cyanosis, or edema  NERVOUS SYSTEM:  Alert & Orientedx3 speech clear. No deficits   MSK: FROM all 4 extremities, 4/5 strength. Unstable on feet  SKIN: No rashes or lesions    67y YO Male With a PMH of  PAST MEDICAL & SURGICAL HISTORY:  No pertinent past surgical history      Patient is a 67y old  Male who presents with a chief complaint of Weakness and LUTS (22 Jun 2022 09:11)      Subjective: Patient was seen and examined by me this morning at bedside.     REVIEW OF SYSTEMS:  CONSTITUTIONAL: No weakness, fevers or chills  EYES/ENT: No visual changes;  No vertigo or throat pain   NECK: No pain or stiffness  RESPIRATORY: No cough, wheezing, hemoptysis; No shortness of breath  CARDIOVASCULAR: No chest pain or palpitations  GASTROINTESTINAL: No abdominal or epigastric pain. No nausea, vomiting; No diarrhea or constipation.   GENITOURINARY: No dysuria, frequency or hematuria  NEUROLOGICAL: No numbness or weakness  SKIN: No itching, burning, rashes, or lesions       Vital Signs Last 24 Hrs  T(C): 36.7 (23 Jun 2022 08:30), Max: 37.1 (22 Jun 2022 16:00)  T(F): 98.1 (23 Jun 2022 08:30), Max: 98.8 (22 Jun 2022 16:00)  HR: 67 (23 Jun 2022 08:30) (65 - 80)  BP: 107/57 (23 Jun 2022 08:30) (100/48 - 117/64)  BP(mean): --  RR: 20 (23 Jun 2022 08:30) (16 - 20)  SpO2: 97% (23 Jun 2022 08:30) (94% - 99%)    PHYSICAL EXAM  Constitutional: Pt lying in bed, awake and alert, NAD  HEENT: EOMI, normocephalic, moist mucous membranes  Neck: Soft and supple, no JVD  Respiratory: CTABL, No wheezing, rales or rhonchi  Cardiovascular: S1S2+, RRR, no M/G/R  Gastrointestinal: BS+, soft, NT/ND, no guarding, no rebound  Extremities: No calf pain  Vascular: Peripheral pulses present  Neurological: AAOx3, no focal deficits  Musculoskeletal: 5/5 strength b/l upper and lower extremities  Skin: No significant new skin lesions or rashes    MEDICATIONS:  MEDICATIONS  (STANDING):  cefTRIAXone   IVPB 1000 milliGRAM(s) IV Intermittent every 24 hours  enoxaparin Injectable 40 milliGRAM(s) SubCutaneous every 24 hours  folic acid 1 milliGRAM(s) Oral daily  influenza  Vaccine (HIGH DOSE) 0.7 milliLiter(s) IntraMuscular once  pantoprazole    Tablet 40 milliGRAM(s) Oral before breakfast  sodium chloride 0.9%. 1000 milliLiter(s) (63 mL/Hr) IV Continuous <Continuous>  thiamine IVPB 250 milliGRAM(s) IV Intermittent daily    MEDICATIONS  (PRN):  acetaminophen     Tablet .. 650 milliGRAM(s) Oral every 6 hours PRN Mild Pain (1 - 3)  LORazepam   Injectable 2 milliGRAM(s) IV Push every 1 hour PRN Symptom-triggered: each CIWA -Ar score 8 or GREATER  LORazepam   Injectable 2 milliGRAM(s) IV Push every 2 hours PRN Symptom-triggered: 2 point increase in CIWA -Ar score and a total score of 7 or LESS  ondansetron Injectable 4 milliGRAM(s) IV Push every 4 hours PRN Nausea and/or Vomiting      LABS: All Labs Reviewed:                        15.0   5.69  )-----------( 250      ( 22 Jun 2022 09:42 )             45.1     06-22    139  |  106  |  16  ----------------------------<  103<H>  4.0   |  26  |  0.85    Ca    9.0      22 Jun 2022 09:42    TPro  6.5  /  Alb  1.9<L>  /  TBili  0.4  /  DBili  x   /  AST  38<H>  /  ALT  48  /  AlkPhos  71  06-22          Blood Culture:   I&O's Summary    22 Jun 2022 07:01  -  23 Jun 2022 07:00  --------------------------------------------------------  IN: 0 mL / OUT: 475 mL / NET: -475 mL      CAPILLARY BLOOD GLUCOSE                RADIOLOGY/EKG:        EKG/RADIOLOGY  < from: Xray Chest 1 View- PORTABLE-Urgent (06.17.22 @ 15:43) >    IMPRESSION:   LEFT mid zone peripheral focal airspace disease. Follow-up   chest radiograph recommended.    --- End of Report ---    < end of copied text >    < from: CT Head No Cont (06.17.22 @ 15:26) >    IMPRESSION:  Minimal chronic microvascular changes without evidence ofan   acute transcortical infarction or hemorrhage.    < end of copied text >

## 2022-06-23 NOTE — DISCHARGE NOTE PROVIDER - HOSPITAL COURSE
Patient is a 67 yo male with a PMH of alcohol use disorder (no hx, tobacco use disorder, GERD who presents with weakness. Pt has a 2 week hx of increasing weakness to his lower extremities and a fall, associated with short term memory loss, shuffling gait, nocturia, polyuria. Last drink was yesterday. Pt saw pcp who told him to present to the ED. In the ED, pt had T of 100.4 POCT UA was positive for blood, leukocyte esterase, WBC, and bacteria. CT head showed no acute changes but CXR showed L mid zone peripheral focal airspace disease. Pt was given fluids along with Ceftriaxone and Azithromycin and admitted for further workup. Infectious disease followed patient. Urology saw patient, added flomax and suggested outpatient urology follow-up. Neurology saw the patient and recommended supportive care. Pt did have hypoxia, was started on oxygen with repeat CXR showed increasing peripheral left upper lobe opacity and CT angio chest showed no PE. Pt's breathing improved and oxygen was titrated down. Hospital course was complicated by acute alcohol withdrawal, w/pt showing high CIWA scores and started on ativan taper. Pt responded to ativan and is no longer in withdrawal.  Today pt is feeling improved, afebrile, hemodynamically stable with better breathing, and is less confused. He will be discharged today for completion of oral abx course and for outpt follow up with PCP, neurologist and urologist.    Vital Signs Last 24 Hrs  T(C): 36.7 (23 Jun 2022 08:30), Max: 37.1 (22 Jun 2022 16:00)  T(F): 98.1 (23 Jun 2022 08:30), Max: 98.8 (22 Jun 2022 16:00)  HR: 67 (23 Jun 2022 08:30) (65 - 80)  BP: 107/57 (23 Jun 2022 08:30) (100/54 - 117/64)  BP(mean): --  RR: 20 (23 Jun 2022 08:30) (16 - 20)  SpO2: 97% (23 Jun 2022 08:30) (94% - 98%)     Patient is a 65 yo male with a PMH of alcohol use disorder (no hx, tobacco use disorder, GERD who presents with weakness. Pt has a 2 week hx of increasing weakness to his lower extremities and a fall, associated with short term memory loss, shuffling gait, nocturia, polyuria. Last drink was yesterday. Pt saw pcp who told him to present to the ED. In the ED, pt had T of 100.4 POCT UA was positive for blood, leukocyte esterase, WBC, and bacteria. CT head showed no acute changes but CXR showed L mid zone peripheral focal airspace disease. Pt was given fluids along with Ceftriaxone and Azithromycin and admitted for further workup. Infectious disease followed patient. Urology saw patient, added flomax and suggested outpatient urology follow-up. Neurology saw the patient and recommended supportive care. Pt did have hypoxia, was started on oxygen with repeat CXR showed increasing peripheral left upper lobe opacity and CT angio chest showed no PE. Pt's breathing improved and oxygen was titrated down. Hospital course was complicated by acute alcohol withdrawal, w/pt showing high CIWA scores and started on ativan taper. Pt responded to ativan and is no longer in withdrawal.  Pt did Today pt is feeling improved, afebrile, hemodynamically stable with better breathing, and is less confused. He will be discharged today for completion of oral abx course and for outpt follow up with PCP, neurologist and urologist.    Vital Signs Last 24 Hrs  T(C): 36.7 (23 Jun 2022 08:30), Max: 37.1 (22 Jun 2022 16:00)  T(F): 98.1 (23 Jun 2022 08:30), Max: 98.8 (22 Jun 2022 16:00)  HR: 67 (23 Jun 2022 08:30) (65 - 80)  BP: 107/57 (23 Jun 2022 08:30) (100/54 - 117/64)  BP(mean): --  RR: 20 (23 Jun 2022 08:30) (16 - 20)  SpO2: 97% (23 Jun 2022 08:30) (94% - 98%)    GENERAL: NAD, lying in bed, much more alert today  HEAD:  Atraumatic, Normocephalic  EYES: EOMI, PERRLA, conjunctiva and sclera clear  ENT: Moist mucous membranes  NECK: Supple, No JVD  Pulm: Some mild b/l crackles No wheezing, or rubs. Unlabored respirations  Cardio: Regular rate and rhythm; No murmurs, rubs, or gallops  Gastro: Bowel sounds present; Soft, Nontender, Nondistended. No hepatomegally  EXTREMITIES:  2+ Peripheral Pulses, brisk capillary refill. No clubbing, cyanosis, or edema  NERVOUS SYSTEM:  Alert & Orientedx3 speech clear. No deficits   MSK: FROM all 4 extremities, 4/5 strength. Unstable on feet  SKIN: No rashes or lesions    EKG/RADIOLOGY  < from: Xray Chest 1 View- PORTABLE-Urgent (06.17.22 @ 15:43) >  IMPRESSION:   LEFT mid zone peripheral focal airspace disease. Follow-up   chest radiograph recommended.  < from: CT Head No Cont (06.17.22 @ 15:26) >    IMPRESSION:  Minimal chronic microvascular changes without evidence ofan   acute transcortical infarction or hemorrhage.

## 2022-06-23 NOTE — PROGRESS NOTE ADULT - SUBJECTIVE AND OBJECTIVE BOX
Date of service: 22 @ 09:36    Lying in bed in NAD  Alert and verbal  Has dry cough  No SOB at rest    ROS: no fever or chills; denies dizziness, no HA, no abdominal pain, no diarrhea or constipation; no dysuria, no legs pain, no rashes    MEDICATIONS  (STANDING):  cefTRIAXone   IVPB 1000 milliGRAM(s) IV Intermittent every 24 hours  enoxaparin Injectable 40 milliGRAM(s) SubCutaneous every 24 hours  folic acid 1 milliGRAM(s) Oral daily  influenza  Vaccine (HIGH DOSE) 0.7 milliLiter(s) IntraMuscular once  pantoprazole    Tablet 40 milliGRAM(s) Oral before breakfast  sodium chloride 0.9%. 1000 milliLiter(s) (63 mL/Hr) IV Continuous <Continuous>  thiamine IVPB 250 milliGRAM(s) IV Intermittent daily    Vital Signs Last 24 Hrs  T(C): 36.7 (2022 08:30), Max: 37.1 (2022 16:00)  T(F): 98.1 (2022 08:30), Max: 98.8 (2022 16:00)  HR: 67 (2022 08:30) (65 - 80)  BP: 107/57 (2022 08:30) (100/48 - 117/64)  BP(mean): --  RR: 20 (2022 08:30) (16 - 20)  SpO2: 97% (2022 08:30) (94% - 99%)     Physical exam:    Constitutional:  No acute distress  HEENT: NC/AT, EOMI, PERRLA, conjunctivae clear; ears and nose atraumatic  Neck: supple; thyroid not palpable  Back: no tenderness  Respiratory: respiratory effort normal; crackles at left base  Cardiovascular: S1S2 regular, no murmurs  Abdomen: soft, not tender, not distended, positive BS  Genitourinary: no suprapubic tenderness  Lymphatic: no LN palpable  Musculoskeletal: no muscle tenderness, no joint swelling or tenderness  Extremities: no pedal edema  Neurological/ Psychiatric: Alert, moving all extremities  Skin: no rashes; no palpable lesions    Labs: reviewed                        14.6   5.12  )-----------( 302      ( 2022 09:14 )             44.4         139  |  106  |  16  ----------------------------<  103<H>  4.0   |  26  |  0.85    Ca    9.0      2022 09:42    TPro  6.5  /  Alb  1.9<L>  /  TBili  0.4  /  DBili  x   /  AST  38<H>  /  ALT  48  /  AlkPhos  71  06-22                          15.7   8.48  )-----------( 157      ( 2022 15:07 )             46.8     06-18    134<L>  |  104  |  7   ----------------------------<  113<H>  3.9   |  22  |  0.92    Ca    8.7      2022 07:48  Phos  2.8     06-18  Mg     2.4     06-18    TPro  6.3  /  Alb  2.1<L>  /  TBili  0.5  /  DBili  x   /  AST  26  /  ALT  26  /  AlkPhos  68  06-18     LIVER FUNCTIONS - ( 2022 07:48 )  Alb: 2.1 g/dL / Pro: 6.3 gm/dL / ALK PHOS: 68 U/L / ALT: 26 U/L / AST: 26 U/L / GGT: x           Urinalysis Basic - ( 2022 15:07 )    Color: Yellow / Appearance: Clear / S.015 / pH: x  Gluc: x / Ketone: Negative  / Bili: Negative / Urobili: 1   Blood: x / Protein: 100 / Nitrite: Negative   Leuk Esterase: Trace / RBC: 6-10 /HPF / WBC 0-2   Sq Epi: x / Non Sq Epi: Occasional / Bacteria: Moderate    ( @ 15:07)  NotDetec      Culture - Urine (collected 2022 15:07)  Source: Clean Catch None  Final Report (2022 19:18):    <10,000 CFU/mL Normal Urogenital Jaqueline    Culture - Blood (collected 2022 15:07)  Source: .Blood None  Preliminary Report (2022 22:01):    No growth to date.    Culture - Blood (collected 2022 15:07)  Source: .Blood None  Preliminary Report (2022 22:01):    No growth to date.    Radiology: all available radiological tests reviewed    < from: Xray Chest 1 View- PORTABLE-Urgent (22 @ 15:43) >  IMPRESSION:   LEFT mid zone peripheral focal airspace disease.  < end of copied text >    Advanced directives addressed: full resuscitation

## 2022-06-23 NOTE — PROGRESS NOTE ADULT - ASSESSMENT
Patient is a 67 yo male current day smoker (5 cigarettes daily) and Chronic ETOH missuse (10+ years) with a PMH of GERD who presents with weakness, frequency, urge incontinence x 2 weeks.       #Acute Alcohol Withdrawal (resolved)  #Confusion 2/2 Wernicke's  #Hx of ETOH Abuse  - After admission, pt was found to be showing sxs of withdrawal, high CIWA scores, most recently 5-6, now  0's  - Pt w/long hx of ETOH abuse, now confused.  - Pt was going through acute withdrawal on the floor, however clinical status has improved  - S/p ativan taper, c/w symptom triggered ativan PRN.   - C/w thiamine 500mg IV TID and folate, Now switched to Thiamine 250mg IV Qd x 5 days  - Neurology consult appreciated; cont abx, cont thiamine supplementation    #Weakness 2/2 Likely Acute UTI, Subacute Community Acquired Pneumonia  #Urge incontinence  - CT Head positive for microvascular changes  - SIRS 1/4 temp 100.4 F Mod QSofa 1/3 AMS on admission.  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 Consider cystoscopy if symptoms persists  - C/w pt recs  - C/w bladder sans  - Low threshold to order CT abdomen/pelvis if spikes another temp     #Coarse breathing, snoring 2/2 SOLOMON in setting of PNA (improved)  - Pt with improved breathing, snoring/coarse breathing reported primarily at night  on O2 NC.  - CXR, CTA chest reviewed, no PE, JENNIFER PNA  - Recommend CPAP at night  - S/p azithromycin 5 d course, C/w Ceftriaxone    #GERD  -C/w Omeprazole PO daily     #Code status  -Full Code    #DVT pppx  -Lovenox 40U Daily     Case discussed with Dr. Shepherd

## 2022-06-23 NOTE — PROGRESS NOTE ADULT - ASSESSMENT
65 y/o male with h/o GERD was admitted on 6/17 for weakness, urinary frequency, urge incontinence x 2 weeks. Patient reports he went to see Dr. Ferguson (PCP) on the day of admission for this problems and was told to come to the ER. Pt reports he noticed increased weakness to his lower extremities (having trouble standing up and walking, with shuffling gait) for the past 2 days. Patient has nocturia (Voids 3-4x) a night for past 5 years. Spouse notes patient with increased confusion and short term memory loss in the past week. Denies fever, chills, HA, recent falls. In the ED patient VS were temp 100.4F and received ceftriaxone.     1. Probable UTI and/or prostatitis. Possible LLL pneumonia.   -encephalopathy much improved  -BC x 2, urine c/s noted  -on ceftriaxone 1 gm IV qd # 6  -tolerating abx well so far; no side effects noted  -more alert  -change abx to ceftin 500 mg PO q12h for 7 more days  -f/u cultures  -monitor temps  -f/u CBC  -supportive care  2. Other issues:   -care per medicine

## 2022-06-24 ENCOUNTER — TRANSCRIPTION ENCOUNTER (OUTPATIENT)
Age: 67
End: 2022-06-24

## 2022-06-24 VITALS
TEMPERATURE: 97 F | SYSTOLIC BLOOD PRESSURE: 109 MMHG | RESPIRATION RATE: 19 BRPM | HEART RATE: 79 BPM | DIASTOLIC BLOOD PRESSURE: 73 MMHG | OXYGEN SATURATION: 99 %

## 2022-06-24 LAB
ALBUMIN SERPL ELPH-MCNC: 2 G/DL — LOW (ref 3.3–5)
ALP SERPL-CCNC: 61 U/L — SIGNIFICANT CHANGE UP (ref 40–120)
ALT FLD-CCNC: 52 U/L — SIGNIFICANT CHANGE UP (ref 12–78)
ANION GAP SERPL CALC-SCNC: 5 MMOL/L — SIGNIFICANT CHANGE UP (ref 5–17)
AST SERPL-CCNC: 51 U/L — HIGH (ref 15–37)
BILIRUB SERPL-MCNC: 0.4 MG/DL — SIGNIFICANT CHANGE UP (ref 0.2–1.2)
BUN SERPL-MCNC: 10 MG/DL — SIGNIFICANT CHANGE UP (ref 7–23)
CALCIUM SERPL-MCNC: 8.5 MG/DL — SIGNIFICANT CHANGE UP (ref 8.5–10.1)
CHLORIDE SERPL-SCNC: 110 MMOL/L — HIGH (ref 96–108)
CO2 SERPL-SCNC: 24 MMOL/L — SIGNIFICANT CHANGE UP (ref 22–31)
CREAT SERPL-MCNC: 0.66 MG/DL — SIGNIFICANT CHANGE UP (ref 0.5–1.3)
EGFR: 103 ML/MIN/1.73M2 — SIGNIFICANT CHANGE UP
GLUCOSE SERPL-MCNC: 98 MG/DL — SIGNIFICANT CHANGE UP (ref 70–99)
HCT VFR BLD CALC: 42 % — SIGNIFICANT CHANGE UP (ref 39–50)
HGB BLD-MCNC: 13.8 G/DL — SIGNIFICANT CHANGE UP (ref 13–17)
MCHC RBC-ENTMCNC: 32.1 PG — SIGNIFICANT CHANGE UP (ref 27–34)
MCHC RBC-ENTMCNC: 32.9 GM/DL — SIGNIFICANT CHANGE UP (ref 32–36)
MCV RBC AUTO: 97.7 FL — SIGNIFICANT CHANGE UP (ref 80–100)
PLATELET # BLD AUTO: 351 K/UL — SIGNIFICANT CHANGE UP (ref 150–400)
POTASSIUM SERPL-MCNC: 4 MMOL/L — SIGNIFICANT CHANGE UP (ref 3.5–5.3)
POTASSIUM SERPL-SCNC: 4 MMOL/L — SIGNIFICANT CHANGE UP (ref 3.5–5.3)
PROT SERPL-MCNC: 6 GM/DL — SIGNIFICANT CHANGE UP (ref 6–8.3)
RBC # BLD: 4.3 M/UL — SIGNIFICANT CHANGE UP (ref 4.2–5.8)
RBC # FLD: 13.9 % — SIGNIFICANT CHANGE UP (ref 10.3–14.5)
SODIUM SERPL-SCNC: 139 MMOL/L — SIGNIFICANT CHANGE UP (ref 135–145)
WBC # BLD: 4.81 K/UL — SIGNIFICANT CHANGE UP (ref 3.8–10.5)
WBC # FLD AUTO: 4.81 K/UL — SIGNIFICANT CHANGE UP (ref 3.8–10.5)

## 2022-06-24 PROCEDURE — 74177 CT ABD & PELVIS W/CONTRAST: CPT | Mod: 26

## 2022-06-24 PROCEDURE — 99232 SBSQ HOSP IP/OBS MODERATE 35: CPT | Mod: GC

## 2022-06-24 RX ORDER — CEFUROXIME AXETIL 250 MG
1 TABLET ORAL
Qty: 12 | Refills: 0
Start: 2022-06-24 | End: 2022-06-29

## 2022-06-24 RX ORDER — NICOTINE POLACRILEX 2 MG
2 GUM BUCCAL THREE TIMES A DAY
Refills: 0 | Status: DISCONTINUED | OUTPATIENT
Start: 2022-06-24 | End: 2022-06-24

## 2022-06-24 RX ORDER — FOLIC ACID 0.8 MG
1 TABLET ORAL
Qty: 15 | Refills: 0
Start: 2022-06-24 | End: 2022-07-08

## 2022-06-24 RX ORDER — THIAMINE MONONITRATE (VIT B1) 100 MG
100 TABLET ORAL
Qty: 15 | Refills: 0
Start: 2022-06-24 | End: 2022-07-08

## 2022-06-24 RX ORDER — NICOTINE POLACRILEX 2 MG
1 GUM BUCCAL
Qty: 0 | Refills: 0 | DISCHARGE
Start: 2022-06-24

## 2022-06-24 RX ORDER — THIAMINE MONONITRATE (VIT B1) 100 MG
1 TABLET ORAL
Qty: 15 | Refills: 0
Start: 2022-06-24 | End: 2022-07-08

## 2022-06-24 RX ADMIN — INFLUENZA VIRUS VACCINE 0.7 MILLILITER(S): 15; 15; 15; 15 SUSPENSION INTRAMUSCULAR at 15:44

## 2022-06-24 RX ADMIN — Medication 500 MILLIGRAM(S): at 09:45

## 2022-06-24 RX ADMIN — Medication 102.5 MILLIGRAM(S): at 09:45

## 2022-06-24 RX ADMIN — Medication 1 MILLIGRAM(S): at 11:37

## 2022-06-24 RX ADMIN — PANTOPRAZOLE SODIUM 40 MILLIGRAM(S): 20 TABLET, DELAYED RELEASE ORAL at 06:05

## 2022-06-24 NOTE — DISCHARGE NOTE PROVIDER - CARE PROVIDER_API CALL
See De La Cruz)  Gastroenterology; Internal Medicine  [address] [place] [address] [place] [address]  Phone: [phone]  Fax: [phone]  Established Patient  Follow Up Time:     [place] [address]  Phone: [phone]  Fax: [phone]  Established Patient  Follow Up Time:

## 2022-06-24 NOTE — DISCHARGE NOTE PROVIDER - NSDCCAREPROVSEEN_GEN_ALL_CORE_FT
Andrea, Melissa Dyreyes, Wayne Shepherd, Katia Damon, Shantell Childers, Phyllis Mccann, Rick Butler, Chele Mathew, Alcides Figueroa, Bo

## 2022-06-24 NOTE — DISCHARGE NOTE PROVIDER - DISCHARGE SERVICE FOR PATIENT
on the discharge service for the patient. I have reviewed and made amendments to the documentation where necessary.
on the discharge service for the patient. I have reviewed and made amendments to the documentation where necessary.

## 2022-06-24 NOTE — DISCHARGE NOTE NURSING/CASE MANAGEMENT/SOCIAL WORK - PATIENT PORTAL LINK FT
You can access the FollowMyHealth Patient Portal offered by [facility] by registering at the following website: [email or web address]. By joining [employer]’s FollowMyHealth portal, you will also be able to view your health information using other applications (apps) compatible with our system.

## 2022-06-24 NOTE — DISCHARGE NOTE PROVIDER - NSDCCPCAREPLAN_GEN_ALL_CORE_FT
PRINCIPAL DISCHARGE DIAGNOSIS  Diagnosis: Lower extremity weakness  Assessment and Plan of Treatment: You came in with some weakness and confusion and difficulty ambulating. We think it is partially due to the Acute UTI/Subacute Community Acquired Pneumonia but also because of the alchol use  - Please finish your course of antibiotics, starting tonight  - Please make an appointment to see your primary care provider within one week for medical management. Please continue taking thiamine and folate.  - Please follow up with outpatient alcohol rehabilitation ASAP  Thank you!      SECONDARY DISCHARGE DIAGNOSES  Diagnosis: Acute vomiting  Assessment and Plan of Treatment: You had some episodes of vomitting. Your CT abdomen did show a possible polyp. Please follow up with Dr. De La Cruz to get a scope on July 6.

## 2022-06-24 NOTE — DISCHARGE NOTE PROVIDER - HOSPITAL COURSE
Patient is a 67 yo male with a PMH of alcohol use disorder (no hx, tobacco use disorder, GERD who presents with weakness. Pt has a 2 week hx of increasing weakness to his lower extremities and a fall, associated with short term memory loss, shuffling gait, nocturia, polyuria. Last drink was yesterday. Pt saw pcp who told him to present to the ED. In the ED, pt had T of 100.4 POCT UA was positive for blood, leukocyte esterase, WBC, and bacteria. CT head showed no acute changes but CXR showed L mid zone peripheral focal airspace disease. Pt was given fluids along with Ceftriaxone and Azithromycin and admitted for further workup. Infectious disease followed patient. Urology saw patient, added flomax and suggested outpatient urology follow-up. Neurology saw the patient and recommended supportive care. Pt did have hypoxia, was started on oxygen with repeat CXR showed increasing peripheral left upper lobe opacity and CT angio chest showed no PE. Pt's breathing improved and oxygen was titrated down. Hospital course was complicated by acute alcohol withdrawal, w/pt showing high CIWA scores and started on ativan taper. Pt responded to ativan and is no longer in withdrawal.  Pt did have some episodes of vomitting, and CT abdomen did show fluid distension with polyp. GI was contacted and recommended outpatient scope. Today pt is feeling improved, afebrile, hemodynamically stable with better breathing, and is less confused. He will be discharged today for completion of oral abx course and for outpt follow up with PCP, neurologist and urologist.    Vital Signs Last 24 Hrs  T(C): 36.7 (23 Jun 2022 08:30), Max: 37.1 (22 Jun 2022 16:00)  T(F): 98.1 (23 Jun 2022 08:30), Max: 98.8 (22 Jun 2022 16:00)  HR: 67 (23 Jun 2022 08:30) (65 - 80)  BP: 107/57 (23 Jun 2022 08:30) (100/54 - 117/64)  BP(mean): --  RR: 20 (23 Jun 2022 08:30) (16 - 20)  SpO2: 97% (23 Jun 2022 08:30) (94% - 98%)    GENERAL: NAD, lying in bed, much more alert today  HEAD:  Atraumatic, Normocephalic  EYES: EOMI, PERRLA, conjunctiva and sclera clear  ENT: Moist mucous membranes  NECK: Supple, No JVD  Pulm: Some mild b/l crackles No wheezing, or rubs. Unlabored respirations  Cardio: Regular rate and rhythm; No murmurs, rubs, or gallops  Gastro: Bowel sounds present; Soft, Nontender, Nondistended. No hepatomegally  EXTREMITIES:  2+ Peripheral Pulses, brisk capillary refill. No clubbing, cyanosis, or edema  NERVOUS SYSTEM:  Alert & Orientedx3 speech clear. No deficits   MSK: FROM all 4 extremities, 4/5 strength. Unstable on feet  SKIN: No rashes or lesions    EKG/RADIOLOGY  < from: Xray Chest 1 View- PORTABLE-Urgent (06.17.22 @ 15:43) >  IMPRESSION:   LEFT mid zone peripheral focal airspace disease. Follow-up   chest radiograph recommended.    < from: CT Head No Cont (06.17.22 @ 15:26) >  IMPRESSION:  Minimal chronic microvascular changes without evidence ofan   acute transcortical infarction or hemorrhage.     < from: CT Abdomen and Pelvis w/ IV Cont (06.24.22 @ 10:34) >  IMPRESSION:  Fluid distended stomach with gastric antral thickening and possible   distal polyp. Correlate with endoscopy.  Partially imaged lingular infiltrate.  Nonobstructing right renal calculus.  Chronic findings as above.  < end of copied text >

## 2022-06-24 NOTE — DISCHARGE NOTE PROVIDER - NSDCMRMEDTOKEN_GEN_ALL_CORE_FT
Fish Oil 1000 mg oral capsule: 1 cap(s) orally once a day  
cefuroxime 500 mg oral tablet: 1 tab(s) orally every 12 hours  Fish Oil 1000 mg oral capsule: 1 cap(s) orally once a day  folic acid 1 mg oral tablet: 1 tab(s) orally once a day  Nicorette 2 mg oral transmucosal gum: 1 gum oral transmucosal 3 times a day, As Needed  thiamine 100 mg oral tablet: 1 tab(s) orally once a day

## 2022-06-24 NOTE — DISCHARGE NOTE NURSING/CASE MANAGEMENT/SOCIAL WORK - NSDCPEFALRISK_GEN_ALL_CORE
For information on Fall & Injury Prevention, visit: [email or web address] OR  [email or web address] OR  https://www.cdc.gov/steadi/patient.html

## 2022-06-24 NOTE — DISCHARGE NOTE PROVIDER - ATTENDING ATTESTATION STATEMENT
I have personally seen and examined the patient. I have collaborated with and supervised the
I have personally seen and examined the patient. I have collaborated with and supervised the

## 2022-06-24 NOTE — DISCHARGE NOTE NURSING/CASE MANAGEMENT/SOCIAL WORK - NSDCVIVACCINE_GEN_ALL_CORE_FT
influenza, high-dose, quadrivalent; 24-Jun-2022 15:44; Keturah Garcia (RN); [employer]; [id number] (Exp. Date: 30-Jun-2022); IntraMuscular; Deltoid Right.; 0.7 milliLiter(s); VIS (VIS Published: 06-Aug-2021, VIS Presented: 24-Jun-2022);

## 2022-06-24 NOTE — DISCHARGE NOTE PROVIDER - PROVIDER TOKENS
PROVIDER:[TOKEN:[[id number]],ESTABLISHEDPATIENT:[T]],PROVIDER:[TOKEN:[[id number]],ESTABLISHEDPATIENT:[T]]

## 2022-06-27 PROBLEM — K21.9 GASTRO-ESOPHAGEAL REFLUX DISEASE WITHOUT ESOPHAGITIS: Chronic | Status: ACTIVE | Noted: 2022-06-26

## 2022-06-27 LAB
CULTURE RESULTS: SIGNIFICANT CHANGE UP
CULTURE RESULTS: SIGNIFICANT CHANGE UP
SPECIMEN SOURCE: SIGNIFICANT CHANGE UP
SPECIMEN SOURCE: SIGNIFICANT CHANGE UP

## 2022-06-30 DIAGNOSIS — F10.239 ALCOHOL DEPENDENCE WITH WITHDRAWAL, UNSPECIFIED: ICD-10-CM

## 2022-06-30 DIAGNOSIS — E51.2 WERNICKE'S ENCEPHALOPATHY: ICD-10-CM

## 2022-06-30 DIAGNOSIS — N20.0 CALCULUS OF KIDNEY: ICD-10-CM

## 2022-06-30 DIAGNOSIS — N39.0 URINARY TRACT INFECTION, SITE NOT SPECIFIED: ICD-10-CM

## 2022-06-30 DIAGNOSIS — E87.1 HYPO-OSMOLALITY AND HYPONATREMIA: ICD-10-CM

## 2022-06-30 DIAGNOSIS — F17.210 NICOTINE DEPENDENCE, CIGARETTES, UNCOMPLICATED: ICD-10-CM

## 2022-06-30 DIAGNOSIS — N39.41 URGE INCONTINENCE: ICD-10-CM

## 2022-06-30 DIAGNOSIS — K21.9 GASTRO-ESOPHAGEAL REFLUX DISEASE WITHOUT ESOPHAGITIS: ICD-10-CM

## 2022-06-30 DIAGNOSIS — E83.39 OTHER DISORDERS OF PHOSPHORUS METABOLISM: ICD-10-CM

## 2022-06-30 DIAGNOSIS — J18.9 PNEUMONIA, UNSPECIFIED ORGANISM: ICD-10-CM

## 2022-06-30 DIAGNOSIS — G47.33 OBSTRUCTIVE SLEEP APNEA (ADULT) (PEDIATRIC): ICD-10-CM

## 2022-07-06 ENCOUNTER — APPOINTMENT (OUTPATIENT)
Dept: GASTROENTEROLOGY | Facility: CLINIC | Age: 67
End: 2022-07-06

## 2022-07-13 ENCOUNTER — APPOINTMENT (OUTPATIENT)
Dept: GASTROENTEROLOGY | Facility: CLINIC | Age: 67
End: 2022-07-13

## 2022-07-13 VITALS
HEIGHT: 67 IN | SYSTOLIC BLOOD PRESSURE: 169 MMHG | BODY MASS INDEX: 32.18 KG/M2 | HEART RATE: 79 BPM | DIASTOLIC BLOOD PRESSURE: 86 MMHG | WEIGHT: 205 LBS

## 2022-07-13 DIAGNOSIS — R93.3 ABNORMAL FINDINGS ON DIAGNOSTIC IMAGING OF OTHER PARTS OF DIGESTIVE TRACT: ICD-10-CM

## 2022-07-13 PROCEDURE — 99204 OFFICE O/P NEW MOD 45 MIN: CPT

## 2022-07-13 NOTE — HISTORY OF PRESENT ILLNESS
[de-identified] : 67 year old man with ETOH, recently admitted to  for sepsis secondary to  UTI, with abnormal imaging here for follow up. \par \par Patient denies any abdominal pain. No post prandial bloating or nausea. No change in BM's. Good appetite. No weight loss. No overt signs of GI bleeding like hematemesis, melena, hematochezia.  \par He states he just had endoscopic evaluation a few months ago with Dr. Cazares, his private GI, which was normal. He is here as during his admission for UTI on abdominal CT scan there was a ?large antral polyp.

## 2024-03-14 NOTE — PATIENT PROFILE ADULT - PACKS YRS CALCULATION
-The morning of surgery hold any Vitamins AND for 2 weeks prior to the procedure, hold any Vitamin E or Herbal supplements  - 3 days before surgery hold (sildenafil (REVATIO) 20 MG tablet      **OUR OFFICE WILL CONTACT YOU WITH YOUR RESULTS WHEN THEY ARE AVAILABLE.  WE WILL CONTACT YOU EVEN IF YOUR RESULTS ARE NORMAL*      [facility]  [phone]   Mon-Friday 7 am- 5 pm      [facility]  Mon - Thurs 7 a.m. to 6 p.m  Friday 7 a.m to 5 p.m  Saturday 7 a.m to 12 p.m     In the next few weeks, you may receive a [employer] survey regarding your most recent clinic visit with us. Please take  a few moments out of your day to accurately evaluate your visit. We strive to provide you with the best medical care  and hope you are happy with our services. Again, thank you for your time and we look forward to your next visit      
5

## 2024-09-03 NOTE — ED ADULT NURSE NOTE - HAVE YOU HAD A FIRST COVID-19 BOOSTER?
Prior to immunization administration, verified patients identity using patient s name and date of birth. Please see Immunization Activity for additional information.     Screening Questionnaire for Adult Immunization    Are you sick today?   No   Do you have allergies to medications, food, a vaccine component or latex?   No   Have you ever had a serious reaction after receiving a vaccination?   No   Do you have a long-term health problem with heart, lung, kidney, or metabolic disease (e.g., diabetes), asthma, a blood disorder, no spleen, complement component deficiency, a cochlear implant, or a spinal fluid leak?  Are you on long-term aspirin therapy?   No   Do you have cancer, leukemia, HIV/AIDS, or any other immune system problem?   No   Do you have a parent, brother, or sister with an immune system problem?   No   In the past 3 months, have you taken medications that affect  your immune system, such as prednisone, other steroids, or anticancer drugs; drugs for the treatment of rheumatoid arthritis, Crohn s disease, or psoriasis; or have you had radiation treatments?   No   Have you had a seizure, or a brain or other nervous system problem?   No   During the past year, have you received a transfusion of blood or blood    products, or been given immune (gamma) globulin or antiviral drug?   No   For women: Are you pregnant or is there a chance you could become       pregnant during the next month?   No   Have you received any vaccinations in the past 4 weeks?   No     Immunization questionnaire answers were all negative.      Patient instructed to remain in clinic for 15 minutes afterwards, and to report any adverse reactions.     Screening performed by Ang Vázquez MA on 9/3/2024 at 10:40 AM.         No

## 2025-01-20 ENCOUNTER — APPOINTMENT (OUTPATIENT)
Dept: ORTHOPEDIC SURGERY | Facility: CLINIC | Age: 70
End: 2025-01-20
Payer: MEDICARE

## 2025-01-20 VITALS — WEIGHT: 210 LBS | BODY MASS INDEX: 32.96 KG/M2 | HEIGHT: 67 IN

## 2025-01-20 DIAGNOSIS — S83.207A UNSPECIFIED TEAR OF UNSPECIFIED MENISCUS, CURRENT INJURY, LEFT KNEE, INITIAL ENCOUNTER: ICD-10-CM

## 2025-01-20 DIAGNOSIS — M25.562 PAIN IN LEFT KNEE: ICD-10-CM

## 2025-01-20 DIAGNOSIS — M17.12 UNILATERAL PRIMARY OSTEOARTHRITIS, LEFT KNEE: ICD-10-CM

## 2025-01-20 PROCEDURE — 99204 OFFICE O/P NEW MOD 45 MIN: CPT

## 2025-01-20 PROCEDURE — 73562 X-RAY EXAM OF KNEE 3: CPT | Mod: LT

## 2025-03-03 ENCOUNTER — APPOINTMENT (OUTPATIENT)
Dept: ORTHOPEDIC SURGERY | Facility: CLINIC | Age: 70
End: 2025-03-03
Payer: MEDICARE

## 2025-03-03 VITALS — HEIGHT: 67 IN | BODY MASS INDEX: 32.96 KG/M2 | WEIGHT: 210 LBS

## 2025-03-03 DIAGNOSIS — S83.207A UNSPECIFIED TEAR OF UNSPECIFIED MENISCUS, CURRENT INJURY, LEFT KNEE, INITIAL ENCOUNTER: ICD-10-CM

## 2025-03-03 DIAGNOSIS — M84.452A PATHOLOGICAL FRACTURE, LEFT FEMUR, INITIAL ENCOUNTER FOR FRACTURE: ICD-10-CM

## 2025-03-03 DIAGNOSIS — M17.12 UNILATERAL PRIMARY OSTEOARTHRITIS, LEFT KNEE: ICD-10-CM

## 2025-03-03 PROCEDURE — 99214 OFFICE O/P EST MOD 30 MIN: CPT

## 2025-03-04 ENCOUNTER — RESULT REVIEW (OUTPATIENT)
Age: 70
End: 2025-03-04

## 2025-03-17 ENCOUNTER — APPOINTMENT (OUTPATIENT)
Dept: ORTHOPEDIC SURGERY | Facility: CLINIC | Age: 70
End: 2025-03-17
Payer: MEDICARE

## 2025-03-17 VITALS — HEIGHT: 67 IN | WEIGHT: 210 LBS | BODY MASS INDEX: 32.96 KG/M2

## 2025-03-17 DIAGNOSIS — M17.12 UNILATERAL PRIMARY OSTEOARTHRITIS, LEFT KNEE: ICD-10-CM

## 2025-03-17 DIAGNOSIS — M84.452A PATHOLOGICAL FRACTURE, LEFT FEMUR, INITIAL ENCOUNTER FOR FRACTURE: ICD-10-CM

## 2025-03-17 DIAGNOSIS — S83.207A UNSPECIFIED TEAR OF UNSPECIFIED MENISCUS, CURRENT INJURY, LEFT KNEE, INITIAL ENCOUNTER: ICD-10-CM

## 2025-03-17 PROCEDURE — 99214 OFFICE O/P EST MOD 30 MIN: CPT

## 2025-04-11 ENCOUNTER — APPOINTMENT (OUTPATIENT)
Dept: ORTHOPEDIC SURGERY | Facility: AMBULATORY SURGERY CENTER | Age: 70
End: 2025-04-11
Payer: MEDICARE

## 2025-04-11 PROCEDURE — 29881 ARTHRS KNE SRG MNISECTMY M/L: CPT | Mod: LT

## 2025-04-11 RX ORDER — ASPIRIN 81 MG
81 TABLET, DELAYED RELEASE (ENTERIC COATED) ORAL
Qty: 60 | Refills: 0 | Status: ACTIVE | COMMUNITY
Start: 2025-04-11 | End: 1900-01-01

## 2025-04-11 RX ORDER — OXYCODONE AND ACETAMINOPHEN 5; 325 MG/1; MG/1
5-325 TABLET ORAL EVERY 6 HOURS
Qty: 10 | Refills: 0 | Status: ACTIVE | COMMUNITY
Start: 2025-04-11 | End: 1900-01-01

## 2025-04-18 ENCOUNTER — APPOINTMENT (OUTPATIENT)
Dept: ORTHOPEDIC SURGERY | Facility: CLINIC | Age: 70
End: 2025-04-18

## 2025-04-22 ENCOUNTER — APPOINTMENT (OUTPATIENT)
Dept: ORTHOPEDIC SURGERY | Facility: CLINIC | Age: 70
End: 2025-04-22
Payer: MEDICARE

## 2025-04-22 DIAGNOSIS — M17.12 UNILATERAL PRIMARY OSTEOARTHRITIS, LEFT KNEE: ICD-10-CM

## 2025-04-22 DIAGNOSIS — Z98.890 OTHER SPECIFIED POSTPROCEDURAL STATES: ICD-10-CM

## 2025-04-22 DIAGNOSIS — S83.207A UNSPECIFIED TEAR OF UNSPECIFIED MENISCUS, CURRENT INJURY, LEFT KNEE, INITIAL ENCOUNTER: ICD-10-CM

## 2025-04-22 PROCEDURE — 99024 POSTOP FOLLOW-UP VISIT: CPT

## 2025-04-30 NOTE — PHYSICAL THERAPY INITIAL EVALUATION ADULT - LEVEL OF INDEPENDENCE: SUPINE/SIT, REHAB EVAL
[email or web address], please discuss with a [place] staff member. Additionally, if your provider is within [place] or their EMR participates in the [facility] network, they can also access this information.     ____________________________________________       
moderate assist (50% patients effort)

## 2025-05-19 ENCOUNTER — APPOINTMENT (OUTPATIENT)
Dept: ORTHOPEDIC SURGERY | Facility: CLINIC | Age: 70
End: 2025-05-19
Payer: MEDICARE

## 2025-05-19 DIAGNOSIS — S83.207A UNSPECIFIED TEAR OF UNSPECIFIED MENISCUS, CURRENT INJURY, LEFT KNEE, INITIAL ENCOUNTER: ICD-10-CM

## 2025-05-19 DIAGNOSIS — M17.12 UNILATERAL PRIMARY OSTEOARTHRITIS, LEFT KNEE: ICD-10-CM

## 2025-05-19 PROCEDURE — 99024 POSTOP FOLLOW-UP VISIT: CPT

## 2025-05-19 PROCEDURE — J3490M: CUSTOM | Mod: NC,JZ

## 2025-05-19 PROCEDURE — 20611 DRAIN/INJ JOINT/BURSA W/US: CPT | Mod: 78,LT

## 2025-06-02 ENCOUNTER — APPOINTMENT (OUTPATIENT)
Dept: ORTHOPEDIC SURGERY | Facility: CLINIC | Age: 70
End: 2025-06-02

## 2025-06-16 ENCOUNTER — APPOINTMENT (OUTPATIENT)
Dept: ORTHOPEDIC SURGERY | Facility: CLINIC | Age: 70
End: 2025-06-16
Payer: MEDICARE

## 2025-06-16 VITALS — WEIGHT: 210 LBS | BODY MASS INDEX: 32.96 KG/M2 | HEIGHT: 67 IN

## 2025-06-16 PROCEDURE — 20610 DRAIN/INJ JOINT/BURSA W/O US: CPT | Mod: 78,LT

## 2025-06-16 PROCEDURE — 99024 POSTOP FOLLOW-UP VISIT: CPT

## 2025-06-23 ENCOUNTER — APPOINTMENT (OUTPATIENT)
Dept: ORTHOPEDIC SURGERY | Facility: CLINIC | Age: 70
End: 2025-06-23
Payer: MEDICARE

## 2025-06-23 VITALS — BODY MASS INDEX: 32.96 KG/M2 | WEIGHT: 210 LBS | HEIGHT: 67 IN

## 2025-06-23 PROCEDURE — 20611 DRAIN/INJ JOINT/BURSA W/US: CPT | Mod: LT

## 2025-06-30 ENCOUNTER — APPOINTMENT (OUTPATIENT)
Dept: ORTHOPEDIC SURGERY | Facility: CLINIC | Age: 70
End: 2025-06-30
Payer: MEDICARE

## 2025-06-30 PROCEDURE — 20611 DRAIN/INJ JOINT/BURSA W/US: CPT | Mod: LT

## 2025-07-21 ENCOUNTER — APPOINTMENT (OUTPATIENT)
Dept: ORTHOPEDIC SURGERY | Facility: CLINIC | Age: 70
End: 2025-07-21

## 2025-07-21 VITALS — HEIGHT: 67 IN | BODY MASS INDEX: 32.96 KG/M2 | WEIGHT: 210 LBS

## 2025-07-21 DIAGNOSIS — Z98.890 OTHER SPECIFIED POSTPROCEDURAL STATES: ICD-10-CM

## 2025-07-21 DIAGNOSIS — M84.452A PATHOLOGICAL FRACTURE, LEFT FEMUR, INITIAL ENCOUNTER FOR FRACTURE: ICD-10-CM

## 2025-07-21 PROCEDURE — 99213 OFFICE O/P EST LOW 20 MIN: CPT

## 2025-07-21 PROCEDURE — 73562 X-RAY EXAM OF KNEE 3: CPT | Mod: LT

## 2025-07-23 ENCOUNTER — RESULT REVIEW (OUTPATIENT)
Age: 70
End: 2025-07-23

## 2025-08-04 ENCOUNTER — APPOINTMENT (OUTPATIENT)
Dept: ORTHOPEDIC SURGERY | Facility: CLINIC | Age: 70
End: 2025-08-04
Payer: MEDICARE

## 2025-08-04 DIAGNOSIS — M17.12 UNILATERAL PRIMARY OSTEOARTHRITIS, LEFT KNEE: ICD-10-CM

## 2025-08-04 DIAGNOSIS — S83.207A UNSPECIFIED TEAR OF UNSPECIFIED MENISCUS, CURRENT INJURY, LEFT KNEE, INITIAL ENCOUNTER: ICD-10-CM

## 2025-08-04 PROCEDURE — 99214 OFFICE O/P EST MOD 30 MIN: CPT

## 2025-08-25 ENCOUNTER — APPOINTMENT (OUTPATIENT)
Dept: ORTHOPEDIC SURGERY | Facility: CLINIC | Age: 70
End: 2025-08-25
Payer: MEDICARE

## 2025-08-25 DIAGNOSIS — G89.29 PAIN IN LEFT KNEE: ICD-10-CM

## 2025-08-25 DIAGNOSIS — M17.12 UNILATERAL PRIMARY OSTEOARTHRITIS, LEFT KNEE: ICD-10-CM

## 2025-08-25 DIAGNOSIS — M25.562 PAIN IN LEFT KNEE: ICD-10-CM

## 2025-08-25 DIAGNOSIS — Z98.890 OTHER SPECIFIED POSTPROCEDURAL STATES: ICD-10-CM

## 2025-08-25 PROCEDURE — 99214 OFFICE O/P EST MOD 30 MIN: CPT

## 2025-08-25 RX ORDER — DICLOFENAC SODIUM 3 G/100G
3 GEL TOPICAL TWICE DAILY
Qty: 1 | Refills: 3 | Status: ACTIVE | COMMUNITY
Start: 2025-08-25 | End: 1900-01-01